# Patient Record
Sex: MALE | Race: WHITE | URBAN - METROPOLITAN AREA
[De-identification: names, ages, dates, MRNs, and addresses within clinical notes are randomized per-mention and may not be internally consistent; named-entity substitution may affect disease eponyms.]

---

## 2018-02-16 ENCOUNTER — APPOINTMENT (OUTPATIENT)
Dept: GENERAL RADIOLOGY | Facility: CLINIC | Age: 68
End: 2018-02-16
Attending: EMERGENCY MEDICINE
Payer: COMMERCIAL

## 2018-02-16 ENCOUNTER — HOSPITAL ENCOUNTER (INPATIENT)
Facility: CLINIC | Age: 68
LOS: 5 days | Discharge: HOME | End: 2018-02-21
Attending: EMERGENCY MEDICINE | Admitting: HOSPITALIST
Payer: COMMERCIAL

## 2018-02-16 DIAGNOSIS — I48.91 ATRIAL FIBRILLATION WITH RAPID VENTRICULAR RESPONSE (H): ICD-10-CM

## 2018-02-16 DIAGNOSIS — J18.9 PNEUMONIA OF BOTH LUNGS DUE TO INFECTIOUS ORGANISM, UNSPECIFIED PART OF LUNG: ICD-10-CM

## 2018-02-16 DIAGNOSIS — J10.1 INFLUENZA A: ICD-10-CM

## 2018-02-16 DIAGNOSIS — J96.01 ACUTE RESPIRATORY FAILURE WITH HYPOXIA (H): Primary | ICD-10-CM

## 2018-02-16 LAB
ANION GAP SERPL CALCULATED.3IONS-SCNC: 9 MMOL/L (ref 3–14)
BASOPHILS # BLD AUTO: 0 10E9/L (ref 0–0.2)
BASOPHILS NFR BLD AUTO: 0.4 %
BUN SERPL-MCNC: 24 MG/DL (ref 7–30)
CALCIUM SERPL-MCNC: 8.2 MG/DL (ref 8.5–10.1)
CHLORIDE SERPL-SCNC: 97 MMOL/L (ref 94–109)
CO2 SERPL-SCNC: 25 MMOL/L (ref 20–32)
CREAT SERPL-MCNC: 0.98 MG/DL (ref 0.66–1.25)
CRP SERPL-MCNC: 430 MG/L (ref 0–8)
DIFFERENTIAL METHOD BLD: ABNORMAL
EOSINOPHIL # BLD AUTO: 0 10E9/L (ref 0–0.7)
EOSINOPHIL NFR BLD AUTO: 0 %
ERYTHROCYTE [DISTWIDTH] IN BLOOD BY AUTOMATED COUNT: 13.6 % (ref 10–15)
GFR SERPL CREATININE-BSD FRML MDRD: 76 ML/MIN/1.7M2
GLUCOSE SERPL-MCNC: 122 MG/DL (ref 70–99)
HCT VFR BLD AUTO: 36.1 % (ref 40–53)
HGB BLD-MCNC: 12.6 G/DL (ref 13.3–17.7)
IMM GRANULOCYTES # BLD: 0.1 10E9/L (ref 0–0.4)
IMM GRANULOCYTES NFR BLD: 1.2 %
INTERPRETATION ECG - MUSE: NORMAL
LACTATE BLD-SCNC: 1.3 MMOL/L (ref 0.7–2)
LYMPHOCYTES # BLD AUTO: 1 10E9/L (ref 0.8–5.3)
LYMPHOCYTES NFR BLD AUTO: 13.4 %
MAGNESIUM SERPL-MCNC: 2.6 MG/DL (ref 1.6–2.3)
MCH RBC QN AUTO: 32.6 PG (ref 26.5–33)
MCHC RBC AUTO-ENTMCNC: 34.9 G/DL (ref 31.5–36.5)
MCV RBC AUTO: 94 FL (ref 78–100)
MONOCYTES # BLD AUTO: 0.7 10E9/L (ref 0–1.3)
MONOCYTES NFR BLD AUTO: 9.1 %
NEUTROPHILS # BLD AUTO: 5.5 10E9/L (ref 1.6–8.3)
NEUTROPHILS NFR BLD AUTO: 75.9 %
NRBC # BLD AUTO: 0 10*3/UL
NRBC BLD AUTO-RTO: 0 /100
NT-PROBNP SERPL-MCNC: 6210 PG/ML (ref 0–900)
PLATELET # BLD AUTO: 196 10E9/L (ref 150–450)
POTASSIUM SERPL-SCNC: 4.4 MMOL/L (ref 3.4–5.3)
PROCALCITONIN SERPL-MCNC: 4.01 NG/ML
RBC # BLD AUTO: 3.86 10E12/L (ref 4.4–5.9)
SODIUM SERPL-SCNC: 131 MMOL/L (ref 133–144)
T4 FREE SERPL-MCNC: 1.5 NG/DL (ref 0.76–1.46)
TROPONIN I SERPL-MCNC: <0.015 UG/L (ref 0–0.04)
TSH SERPL DL<=0.005 MIU/L-ACNC: 0.13 MU/L (ref 0.4–4)
WBC # BLD AUTO: 7.3 10E9/L (ref 4–11)

## 2018-02-16 PROCEDURE — 25000125 ZZHC RX 250: Performed by: HOSPITALIST

## 2018-02-16 PROCEDURE — 83735 ASSAY OF MAGNESIUM: CPT | Performed by: EMERGENCY MEDICINE

## 2018-02-16 PROCEDURE — 25000128 H RX IP 250 OP 636: Performed by: HOSPITALIST

## 2018-02-16 PROCEDURE — 94640 AIRWAY INHALATION TREATMENT: CPT | Mod: 76

## 2018-02-16 PROCEDURE — 83605 ASSAY OF LACTIC ACID: CPT | Performed by: EMERGENCY MEDICINE

## 2018-02-16 PROCEDURE — 25000132 ZZH RX MED GY IP 250 OP 250 PS 637: Performed by: EMERGENCY MEDICINE

## 2018-02-16 PROCEDURE — 80048 BASIC METABOLIC PNL TOTAL CA: CPT | Performed by: EMERGENCY MEDICINE

## 2018-02-16 PROCEDURE — 87181 SC STD AGAR DILUTION PER AGT: CPT | Performed by: EMERGENCY MEDICINE

## 2018-02-16 PROCEDURE — 25000132 ZZH RX MED GY IP 250 OP 250 PS 637: Performed by: HOSPITALIST

## 2018-02-16 PROCEDURE — 87040 BLOOD CULTURE FOR BACTERIA: CPT | Performed by: EMERGENCY MEDICINE

## 2018-02-16 PROCEDURE — 87205 SMEAR GRAM STAIN: CPT | Performed by: HOSPITALIST

## 2018-02-16 PROCEDURE — 84484 ASSAY OF TROPONIN QUANT: CPT | Performed by: EMERGENCY MEDICINE

## 2018-02-16 PROCEDURE — 27210995 ZZH RX 272: Performed by: EMERGENCY MEDICINE

## 2018-02-16 PROCEDURE — 84439 ASSAY OF FREE THYROXINE: CPT | Performed by: EMERGENCY MEDICINE

## 2018-02-16 PROCEDURE — 87800 DETECT AGNT MULT DNA DIREC: CPT | Performed by: EMERGENCY MEDICINE

## 2018-02-16 PROCEDURE — 85025 COMPLETE CBC W/AUTO DIFF WBC: CPT | Performed by: EMERGENCY MEDICINE

## 2018-02-16 PROCEDURE — 84145 PROCALCITONIN (PCT): CPT | Performed by: EMERGENCY MEDICINE

## 2018-02-16 PROCEDURE — 71046 X-RAY EXAM CHEST 2 VIEWS: CPT

## 2018-02-16 PROCEDURE — 84443 ASSAY THYROID STIM HORMONE: CPT | Performed by: EMERGENCY MEDICINE

## 2018-02-16 PROCEDURE — 96375 TX/PRO/DX INJ NEW DRUG ADDON: CPT

## 2018-02-16 PROCEDURE — 25000128 H RX IP 250 OP 636: Performed by: EMERGENCY MEDICINE

## 2018-02-16 PROCEDURE — 21000000 ZZH R&B IMCU HEART CARE

## 2018-02-16 PROCEDURE — 87077 CULTURE AEROBIC IDENTIFY: CPT | Performed by: EMERGENCY MEDICINE

## 2018-02-16 PROCEDURE — 99285 EMERGENCY DEPT VISIT HI MDM: CPT | Mod: 25

## 2018-02-16 PROCEDURE — 96365 THER/PROPH/DIAG IV INF INIT: CPT

## 2018-02-16 PROCEDURE — 40000275 ZZH STATISTIC RCP TIME EA 10 MIN

## 2018-02-16 PROCEDURE — 87070 CULTURE OTHR SPECIMN AEROBIC: CPT | Performed by: HOSPITALIST

## 2018-02-16 PROCEDURE — 86140 C-REACTIVE PROTEIN: CPT | Performed by: EMERGENCY MEDICINE

## 2018-02-16 PROCEDURE — 93005 ELECTROCARDIOGRAM TRACING: CPT

## 2018-02-16 PROCEDURE — 99223 1ST HOSP IP/OBS HIGH 75: CPT | Mod: AI | Performed by: HOSPITALIST

## 2018-02-16 PROCEDURE — 83880 ASSAY OF NATRIURETIC PEPTIDE: CPT | Performed by: EMERGENCY MEDICINE

## 2018-02-16 RX ORDER — POTASSIUM CL/LIDO/0.9 % NACL 10MEQ/0.1L
10 INTRAVENOUS SOLUTION, PIGGYBACK (ML) INTRAVENOUS
Status: DISCONTINUED | OUTPATIENT
Start: 2018-02-16 | End: 2018-02-21 | Stop reason: HOSPADM

## 2018-02-16 RX ORDER — ACETAMINOPHEN 325 MG/1
975 TABLET ORAL ONCE
Status: COMPLETED | OUTPATIENT
Start: 2018-02-16 | End: 2018-02-16

## 2018-02-16 RX ORDER — NITROGLYCERIN 0.4 MG/1
0.4 TABLET SUBLINGUAL EVERY 5 MIN PRN
Status: DISCONTINUED | OUTPATIENT
Start: 2018-02-16 | End: 2018-02-21

## 2018-02-16 RX ORDER — MAGNESIUM SULFATE HEPTAHYDRATE 40 MG/ML
4 INJECTION, SOLUTION INTRAVENOUS EVERY 4 HOURS PRN
Status: DISCONTINUED | OUTPATIENT
Start: 2018-02-16 | End: 2018-02-21 | Stop reason: HOSPADM

## 2018-02-16 RX ORDER — POTASSIUM CHLORIDE 1.5 G/1.58G
20-40 POWDER, FOR SOLUTION ORAL
Status: DISCONTINUED | OUTPATIENT
Start: 2018-02-16 | End: 2018-02-21 | Stop reason: HOSPADM

## 2018-02-16 RX ORDER — SODIUM CHLORIDE 9 MG/ML
INJECTION, SOLUTION INTRAVENOUS CONTINUOUS
Status: DISCONTINUED | OUTPATIENT
Start: 2018-02-16 | End: 2018-02-17

## 2018-02-16 RX ORDER — ONDANSETRON 4 MG/1
4 TABLET, ORALLY DISINTEGRATING ORAL EVERY 6 HOURS PRN
Status: DISCONTINUED | OUTPATIENT
Start: 2018-02-16 | End: 2018-02-21 | Stop reason: HOSPADM

## 2018-02-16 RX ORDER — POTASSIUM CHLORIDE 29.8 MG/ML
20 INJECTION INTRAVENOUS
Status: DISCONTINUED | OUTPATIENT
Start: 2018-02-16 | End: 2018-02-21 | Stop reason: HOSPADM

## 2018-02-16 RX ORDER — LIDOCAINE 40 MG/G
CREAM TOPICAL
Status: CANCELLED | OUTPATIENT
Start: 2018-02-16

## 2018-02-16 RX ORDER — NALOXONE HYDROCHLORIDE 0.4 MG/ML
.1-.4 INJECTION, SOLUTION INTRAMUSCULAR; INTRAVENOUS; SUBCUTANEOUS
Status: DISCONTINUED | OUTPATIENT
Start: 2018-02-16 | End: 2018-02-21 | Stop reason: HOSPADM

## 2018-02-16 RX ORDER — ASPIRIN 81 MG/1
81 TABLET, CHEWABLE ORAL DAILY
Status: DISCONTINUED | OUTPATIENT
Start: 2018-02-16 | End: 2018-02-21 | Stop reason: HOSPADM

## 2018-02-16 RX ORDER — ACETAMINOPHEN 325 MG/1
650 TABLET ORAL EVERY 4 HOURS PRN
Status: DISCONTINUED | OUTPATIENT
Start: 2018-02-16 | End: 2018-02-21 | Stop reason: HOSPADM

## 2018-02-16 RX ORDER — EAR PLUGS
1 EACH OTIC (EAR) DAILY
COMMUNITY
End: 2020-07-02

## 2018-02-16 RX ORDER — MORPHINE SULFATE 2 MG/ML
1 INJECTION, SOLUTION INTRAMUSCULAR; INTRAVENOUS
Status: DISCONTINUED | OUTPATIENT
Start: 2018-02-16 | End: 2018-02-21 | Stop reason: HOSPADM

## 2018-02-16 RX ORDER — POLYETHYLENE GLYCOL 3350 17 G/17G
17 POWDER, FOR SOLUTION ORAL DAILY PRN
Status: DISCONTINUED | OUTPATIENT
Start: 2018-02-16 | End: 2018-02-21 | Stop reason: HOSPADM

## 2018-02-16 RX ORDER — AMOXICILLIN 250 MG
2 CAPSULE ORAL 2 TIMES DAILY PRN
Status: DISCONTINUED | OUTPATIENT
Start: 2018-02-16 | End: 2018-02-21 | Stop reason: HOSPADM

## 2018-02-16 RX ORDER — METOPROLOL TARTRATE 1 MG/ML
5 INJECTION, SOLUTION INTRAVENOUS ONCE
Status: DISCONTINUED | OUTPATIENT
Start: 2018-02-16 | End: 2018-02-16

## 2018-02-16 RX ORDER — ACETAMINOPHEN 650 MG/1
650 SUPPOSITORY RECTAL EVERY 4 HOURS PRN
Status: DISCONTINUED | OUTPATIENT
Start: 2018-02-16 | End: 2018-02-21 | Stop reason: HOSPADM

## 2018-02-16 RX ORDER — ONDANSETRON 2 MG/ML
4 INJECTION INTRAMUSCULAR; INTRAVENOUS EVERY 6 HOURS PRN
Status: DISCONTINUED | OUTPATIENT
Start: 2018-02-16 | End: 2018-02-21 | Stop reason: HOSPADM

## 2018-02-16 RX ORDER — GUAIFENESIN/DEXTROMETHORPHAN 100-10MG/5
10 SYRUP ORAL EVERY 4 HOURS PRN
Status: DISCONTINUED | OUTPATIENT
Start: 2018-02-16 | End: 2018-02-21 | Stop reason: HOSPADM

## 2018-02-16 RX ORDER — POTASSIUM CHLORIDE 1500 MG/1
20-40 TABLET, EXTENDED RELEASE ORAL
Status: DISCONTINUED | OUTPATIENT
Start: 2018-02-16 | End: 2018-02-21 | Stop reason: HOSPADM

## 2018-02-16 RX ORDER — HYDROCODONE BITARTRATE AND ACETAMINOPHEN 5; 325 MG/1; MG/1
1-2 TABLET ORAL EVERY 4 HOURS PRN
Status: DISCONTINUED | OUTPATIENT
Start: 2018-02-16 | End: 2018-02-21 | Stop reason: HOSPADM

## 2018-02-16 RX ORDER — DIPHENHYDRAMINE HYDROCHLORIDE AND LIDOCAINE HYDROCHLORIDE AND ALUMINUM HYDROXIDE AND MAGNESIUM HYDRO
10 KIT EVERY 6 HOURS PRN
Status: DISCONTINUED | OUTPATIENT
Start: 2018-02-16 | End: 2018-02-21 | Stop reason: HOSPADM

## 2018-02-16 RX ORDER — BISACODYL 10 MG
10 SUPPOSITORY, RECTAL RECTAL DAILY PRN
Status: DISCONTINUED | OUTPATIENT
Start: 2018-02-16 | End: 2018-02-21 | Stop reason: HOSPADM

## 2018-02-16 RX ORDER — METOPROLOL TARTRATE 25 MG/1
25 TABLET, FILM COATED ORAL ONCE
Status: COMPLETED | OUTPATIENT
Start: 2018-02-16 | End: 2018-02-16

## 2018-02-16 RX ORDER — METOPROLOL TARTRATE 1 MG/ML
2.5 INJECTION, SOLUTION INTRAVENOUS
Status: DISCONTINUED | OUTPATIENT
Start: 2018-02-16 | End: 2018-02-21 | Stop reason: HOSPADM

## 2018-02-16 RX ORDER — AMOXICILLIN 250 MG
1 CAPSULE ORAL 2 TIMES DAILY PRN
Status: DISCONTINUED | OUTPATIENT
Start: 2018-02-16 | End: 2018-02-21 | Stop reason: HOSPADM

## 2018-02-16 RX ORDER — OSELTAMIVIR PHOSPHATE 75 MG/1
75 CAPSULE ORAL 2 TIMES DAILY
Status: DISCONTINUED | OUTPATIENT
Start: 2018-02-16 | End: 2018-02-19

## 2018-02-16 RX ORDER — METOPROLOL TARTRATE 25 MG/1
25 TABLET, FILM COATED ORAL EVERY 8 HOURS
Status: DISCONTINUED | OUTPATIENT
Start: 2018-02-17 | End: 2018-02-18

## 2018-02-16 RX ORDER — IPRATROPIUM BROMIDE AND ALBUTEROL SULFATE 2.5; .5 MG/3ML; MG/3ML
3 SOLUTION RESPIRATORY (INHALATION) 4 TIMES DAILY
Status: DISCONTINUED | OUTPATIENT
Start: 2018-02-16 | End: 2018-02-21 | Stop reason: HOSPADM

## 2018-02-16 RX ORDER — POTASSIUM CHLORIDE 7.45 MG/ML
10 INJECTION INTRAVENOUS
Status: DISCONTINUED | OUTPATIENT
Start: 2018-02-16 | End: 2018-02-21 | Stop reason: HOSPADM

## 2018-02-16 RX ADMIN — CEFTRIAXONE 2 G: 10 INJECTION, POWDER, FOR SOLUTION INTRAVENOUS at 17:57

## 2018-02-16 RX ADMIN — SODIUM CHLORIDE: 9 INJECTION, SOLUTION INTRAVENOUS at 19:50

## 2018-02-16 RX ADMIN — IPRATROPIUM BROMIDE AND ALBUTEROL SULFATE 3 ML: .5; 3 SOLUTION RESPIRATORY (INHALATION) at 19:55

## 2018-02-16 RX ADMIN — SODIUM CHLORIDE 1000 ML: 9 INJECTION, SOLUTION INTRAVENOUS at 16:07

## 2018-02-16 RX ADMIN — METOPROLOL TARTRATE 25 MG: 25 TABLET ORAL at 18:32

## 2018-02-16 RX ADMIN — ENOXAPARIN SODIUM 40 MG: 40 INJECTION SUBCUTANEOUS at 20:55

## 2018-02-16 RX ADMIN — ASPIRIN 81 MG 81 MG: 81 TABLET ORAL at 20:55

## 2018-02-16 RX ADMIN — AZITHROMYCIN MONOHYDRATE 500 MG: 500 INJECTION, POWDER, LYOPHILIZED, FOR SOLUTION INTRAVENOUS at 17:58

## 2018-02-16 RX ADMIN — OSELTAMIVIR PHOSPHATE 75 MG: 75 CAPSULE ORAL at 20:55

## 2018-02-16 RX ADMIN — ACETAMINOPHEN 975 MG: 325 TABLET, FILM COATED ORAL at 17:58

## 2018-02-16 ASSESSMENT — ACTIVITIES OF DAILY LIVING (ADL)
BATHING: 0-->INDEPENDENT
DRESS: 0-->INDEPENDENT
TOILETING: 0-->INDEPENDENT
RETIRED_COMMUNICATION: 0-->UNDERSTANDS/COMMUNICATES WITHOUT DIFFICULTY
SWALLOWING: 0-->SWALLOWS FOODS/LIQUIDS WITHOUT DIFFICULTY
TRANSFERRING: 0-->INDEPENDENT
AMBULATION: 0-->INDEPENDENT
FALL_HISTORY_WITHIN_LAST_SIX_MONTHS: NO
RETIRED_EATING: 0-->INDEPENDENT
COGNITION: 0 - NO COGNITION ISSUES REPORTED

## 2018-02-16 ASSESSMENT — ENCOUNTER SYMPTOMS
FEVER: 1
SHORTNESS OF BREATH: 1
DIFFICULTY URINATING: 0
BLOOD IN STOOL: 0
COUGH: 1
SORE THROAT: 1
CONSTIPATION: 1
PALPITATIONS: 0
RHINORRHEA: 1
MYALGIAS: 0
DIARRHEA: 0

## 2018-02-16 NOTE — ED PROVIDER NOTES
History     Chief Complaint:  Shortness of breath     HPI   Rafael Josue is a 67 year old male who presents to the emergency department for evaluation of shortness of breath. The patient is a poor historian. The patient states that his symptoms began six days ago. He did not feel like his normal self at that time, and he adds that he felt feverish with subjective fever. [facility], and a flu swab was done six days ago. The patient has Influenza A, and he has been taking Tamiflu for five days. He was sent here to the clinic for evaluation today because he was in new Atrial Fibrillation while there that he has never had before. His clinician states that she would have sent him anyhow to rule out pneumonia. Now, he adds that he still has productive cough, sore throat, rhinorrhea, some intermittent constipation, myalgias, fever, and shortness of breath getting worse since his diagnosis. He denies diarrhea and blood in his stool, decreased urine, difficulty urinating, and palpitations. He denies a known history of COPD, diabetes, liver disease, kidney disease, a history of arrhythmias, or any notable health issues.     Cardiac/PE/DVT Risk Factors:  History of hypertension - negative   History of hyperlipidemia - positive   History of diabetes - negative   History of smoking - positive   Personal history of PE/DVT - negative   Family history of PE/DVT - negative   Family history of heart complications - negative   Recent travel - positive   Recent surgery - negative   Other immobilizations - positive Influenza A recently  Cancer - negative     Allergies:  No Known Drug Allergies     Medications:    The patient is currently on no regular medications.     Past Medical History:    Former smoker  Overweight  Adenomatous Polyp of descending colon    Carpal tunnel syndrome  Polycythemia - mild   Hyperlipidemia  Crushing injury to left elbow  Inguinal hernia    Past Surgical History:    ORIF  "left elbow     Family History:    History reviewed. No pertinent family history.      Social History:  The patient was accompanied to the ED by his wife.  Smoking Status: Former, 0.5-1 PPD   Smokeless Tobacco: Former  Alcohol Use: No   Marital Status:        Review of Systems   Constitutional: Positive for fever.   HENT: Positive for rhinorrhea and sore throat.    Respiratory: Positive for cough and shortness of breath.    Cardiovascular: Negative for palpitations and leg swelling.   Gastrointestinal: Positive for constipation. Negative for blood in stool and diarrhea.   Genitourinary: Negative for decreased urine volume and difficulty urinating.   Musculoskeletal: Negative for myalgias.   All other systems reviewed and are negative.      Physical Exam     Patient Vitals for the past 24 hrs:   BP Temp Temp src Heart Rate Resp SpO2 Height Weight   02/16/18 1536 140/85 - - 120 (!) 39 93 % - -   02/16/18 1525 123/74 99.9  F (37.7  C) Oral 127 - (!) 85 % 1.753 m (5' 9\") 81.6 kg (180 lb)        Physical Exam  SKIN:  Warm, dry.  No jaundice.  No rash.  HEMATOLOGIC/IMMUNOLOGIC/LYMPHATIC:  No pallor.  No edema.  HENT:  Moist oral mucosa.  Normal phonation.  No stridor.  No JVD.  EYES:  Conjunctivae normal.  Anicteric.  CARDIOVASCULAR:  Tachycardic rate and irregularly irregular rhythm.  No murmur.  RESPIRATORY:  No respiratory distress, breath sounds equal and coarse throughout.  GASTROINTESTINAL:  Soft, nontender abdomen.  NEUROLOGIC:  Alert, conversant.  PSYCHIATRIC:  Normal mood.    Emergency Department Course     ECG:  Indication: Shortness of breath   Completed at 1609.  Read at 1658.   Atrial Fibrillation with RVR  Abnormal ECG  Compared to old ECG, no significant changes   Rate 117 bpm. VT interval *. QRS duration 92. QT/QTc 312/435. P-R-T axes * -3 43.    Imaging:  Radiology findings were communicated with the patient and family who voiced understanding of the findings.    XR Chest 2 views:  IMPRESSION: " Bilateral pulmonary opacities primarily left upper lung  and right base.  Report per radiology     Laboratory:  Laboratory findings were communicated with the patient and family who voiced understanding of the findings.    CBC: HGB 12.6 (L) o/w WNL. (WBC 7.3, )   BMP:  (L), Glucose 122 (H), Calcium 8.2 (L) o/w WNL (Creatinine 0.98)    Troponin (Collected 1605): <0.015  Lactic Acid: 1.3    Blood cultures: Pending  Blood cultures: Pending    Interventions:  1607 - NS Bolus 1,000mL IV   1757 - Rocephin 2g IV  1758 - Acetaminophen 975mg PO  1758 - Azithromycin 500mg IV    Emergency Department Course:  Nursing notes and vitals reviewed.  EKG obtained in the ED, see results above.   The patient was sent for a XR Chest 2 views while in the emergency department, results above.   IV was inserted and blood was drawn for laboratory testing, results above.    1549: I performed an exam of the patient as documented above.   1726: Patient rechecked and updated.   1734: I spoke with Dr. Spicer of the Hospitalist service regarding patient's presentation, findings, and plan of care.    Findings and plan explained to the Patient and spouse who consents to admission. Discussed the patient with Dr. Spicer, who will admit the patient to a [facility] bed for further monitoring, evaluation, and treatment.   I personally reviewed the laboratory and imaging results with the Patient and spouse and answered all related questions prior to admission.    Impression & Plan      Medical Decision Making:  Rafael Josue is a 67 year old male who presents to the emergency department today with symptoms of a respiratory infectious disease and was recently diagnosed and treated for influenza A with tamiflu. He just finished that. He's been feeling a bit worse and imaging revealed bilateral pneumonia. Incidently, noted to be in Atrial Fibrillation with RVR which would be a new arrhythmia for him. He was hypoxic before being administered  nasal cannula oxygenation. His pulse rate did drop somewhat with IV fluid bolus and after discussion with the admitting physician Dr. Spicer we opted for a dose of oral metoprolol for further rate control, IV antibiotics regarding community acquired pneumonia and admission for close monitoring, treatment and testing as needed.     Critical Care time:  none    Diagnosis:    ICD-10-CM    1. Atrial fibrillation with rapid ventricular response (H) I48.91    2. Pneumonia of both lungs due to infectious organism, unspecified part of lung J18.9    3. Influenza A J10.1        Disposition:  Admitted to [facility]    Scribe Disclosure:  Brigitte PETERSEN, am serving as a scribe at 3:49 PM on 2/16/2018 to document services personally performed by Jeremy De Paz MD based on my observations and the provider's statements to me.   2/16/2018    EMERGENCY DEPARTMENT       Jeremy De Paz MD  02/17/18 1004

## 2018-02-16 NOTE — IP AVS SNAPSHOT
MRN:[id number]                      After Visit Summary   2/16/2018    Rafael Josue    MRN: [id number]           Thank you!     Thank you for choosing [facility] for your care. Our goal is always to provide you with excellent care. Hearing back from our patients is one way we can continue to improve our services. Please take a few minutes to complete the written survey that you may receive in the mail after you visit with us. Thank you!        Patient Information     Date Of Birth          1950        Designated Caregiver       Most Recent Value    Caregiver    Will someone help with your care after discharge? yes    Name of designated caregiver Meet Spouse    Phone number of caregiver See pt information/contact    Caregiver address see pt information/contact      About your hospital stay     You were admitted on:  February 16, 2018 [facility]    You were discharged on:  February 21, 2018        Reason for your hospital stay       You were admitted with shortness of breath from influenza/pneumonias and had irregular heartbeat-atrial fibrillation.    Diagnosis  Acute hypoxic respiratory failure requiring supplemental oxygen from pneumonia.  Community acquired pneumonia at-risk for bacterial organisms.   New onset atrial fibrillation with rapid ventricular response-likely in the setting of pneumonia/respiratory decompensation.    Influenza Upper respiratory infection   Gram-negative bacteremia with Haemophilus influenza - cleared    Mild hyponatremia from poor oral intake-improved  Mild rhabdomyolysis likely from acute illness/ flu   Supressed TSH likely from acute illness  Nicotine dependence  Incidental pulmonary nodule-left lung 0.8 cm  Concern for obstructive sleep apnea.  Prediabetes with hemoglobin A1c of 6.3.  Concern for mild cognitive impairment   Deconditioned multifactorial                  Who to Call     For medical  emergencies, please call 911.  For non-urgent questions about your medical care, please call your primary care provider or clinic, [phone]          Attending Provider     Provider Specialty    Jeremy De Paz MD Emergency Medicine    Marvin Spicer MD Internal Medicine       Primary Care Provider Office Phone # Fax #    [facility] [phone]       When to contact your care team       Call your primary doctor if you have any of the following:  increased shortness of breath any chest pain or palpitations.                  After Care Instructions     Activity       Your activity upon discharge: activity as tolerated and no driving until cognitive assessment as outpatient            Diet       Follow this diet upon discharge: Orders Placed This Encounter      Combination Diet Low Saturated Fat Na <2400mg Diet, No Caffeine Diet            Discharge Instructions       Follow-up for incidental pulmonary nodule per protocol.  Recommend follow-up imaging of chest after resolution of symptoms.  Aggressive incentives spirometry.  Supportive care with Robitussin syrup/lozenges.  On completer two weeks course of antibiotics.  Supplemental nasal oxygen to keep saturations about 90%. Wean as tolerated.  Low-salt low carb diet.  Discuss long-term anticoagulation on cardiology visit.  Event recorder at the time of discharge with cardiology follow-up.  Consider overnight pulse oximetry/sleep study once symptoms improve.   Consider lung function tests given patient's history of smoking.  closely monitor INR while on antibiotics.Goal INR between 2 to 3.  Emphasized on frequent handwashing and adequate oral hydration.  Recheck thyroid function in 4 to 6 weeks.  Strongly consider smoking cessation.  Monitor blood sugars /hemoglobin A1c periodically   outpatient cognitive assessment.  No driving until clearance from medical provider.   Note off for work for work until PCP visit  provided.            Monitor and record       blood pressure/heart rate daily review on PCP visit. Optimize dose of metoprolol/Cardizem/Lasix on provider visit.            Oxygen Adult       [facility] Order 2 liter(s) by nasal cannula continuously with use of portable tank. Expected treatment length is 2 weeks.. Test on conserving device as applicable.    Patients who qualify for home O2 coverage under the CMS guidelines require ABG tests or O2 sat readings obtained closest to, but no earlier than 2 days prior to the discharge, as evidence of the need for home oxygen therapy. Testing must be performed while patient is in the chronic stable state. See notes for O2 sats.    I certify that this patient, Rafael Josue has been under my care and that I, or a nurse practitioner or physician's assistant working with me, had a face-to-face encounter that meets the face-to-face encounter requirements with this patient on 2/16/2018. Patient will have reassessment on PCP visit in few days for weaning off nasal oxygen. The patient, Rafael Josue was evaluated or treated in whole, or in part, for the following medical condition, which necessitates the use of the ordered oxygen    . Treatment Diagnosis: Acute hypoxic respiratory failure requiring supplemental oxygen from pneumonia.  Community acquired pneumonia at-risk for bacterial organisms.   New onset atrial fibrillation with rapid ventricular response-likely in the setting of pneumonia/respiratory decompensation.    Influenza Upper respiratory infection   nicotine dependence    Attending Provider: Marvin Spicer  Physician signature: See electronic signature associated with these discharge orders  Date of Order: February 21, 2018                  Follow-up Appointments     Follow-up and recommended labs and tests        Follow up appointment is at 2/23 at 1:30pm with Dr. Hand  At [facility]  [address] [place] [address] [address]  [place]  [address]  [phone]    Please bring discharge paperwork from hospital.  You will have your INR drawn then            Follow-up and recommended labs and tests        Follow up with primary care provider, [facility], within 3 days for hospital follow- up.  The following labs/tests are recommended: INR / BMP.                  Your next 10 appointments already scheduled     Mar 26, 2018  1:50 PM CDT   Return Discharge with JACKELIN Vasquez CNP   [facility] ([facility])    [facility] [facility] [address]  [facility] [id number] [phone]              Additional Services     Follow-Up with Cardiac Advanced Practice Provider                 Future tests that were ordered for you     Cardiac Event Monitor - Peds/Adult                 Further instructions from your care team       Oxygen Provider:  Arranged through [employer], contact number [phone].If you have any questions or concerns please call the oxygen company directly.      Warfarin Instruction     You have started taking a medicine called warfarin. This is a blood-thinning medicine (anticoagulant). It helps prevent and treat blood clots.      Before leaving the hospital, make sure you know how much to take and how long to take it.      You will need regular blood tests to make sure your blood is clotting safely. It is very important to see your doctor for regular blood tests.    Talk to your doctor before taking any new medicine (this includes over-the-counter drugs and herbal products). Many medicines can interact with warfarin. This may cause more bleeding or too much clotting.     Eating a lot of vitamin K--found in green, leafy vegetables--can change the way warfarin works in your body. Do NOT avoid these foods. Instead, try to eat the same amount each day.     Bleeding is the most common side-effect of warfarin. You may notice bleeding gums, a bloody  "nose, bruises and bleeding longer when you cut yourself. See a doctor at once if:   o You cough up blood  o You find blood in your stool (poop)  o You have a deep cut, or a cut that bleeds longer than 10 minutes   o You have a bad cut, hard fall, accident or hit your head (go to urgent care or the emergency room).    For women who can get pregnant: This medicine can harm an unborn baby. Be very careful not to get pregnant while taking this medicine. If you think you might be pregnant, call your doctor right away.    For more information, read \"Guide to Warfarin Therapy,  the booklet you received in the hospital.        Pending Results     Date and Time Order Name Status Description    2/18/2018 0601 Blood culture Preliminary     2/18/2018 0601 Blood culture Preliminary     2/16/2018 1600 Blood culture Preliminary     2/16/2018 1600 Blood culture Preliminary             Statement of Approval     Ordered          02/21/18 1143  I have reviewed and agree with all the recommendations and orders detailed in this document.  EFFECTIVE NOW     Approved and electronically signed by:  Marvin Spicer MD             Admission Information     Date & Time Provider Department Dept. Phone    2/16/2018 Marvin Spicer MD [facility] [phone]      Your Vitals Were     Blood Pressure Temperature Respirations Height Weight Pulse Oximetry    112/76 98.3  F (36.8  C) (Axillary) 22 1.753 m (5' 9\") 82.7 kg (182 lb 6.4 oz) 93%    BMI (Body Mass Index)                   26.94 kg/m2           [facility] Information     [facility] lets you send messages to your doctor, view your test results, renew your prescriptions, schedule appointments and more. To sign up, go to [email or web address] . Click on \"Log in\" on the left side of the screen, which will take you to the Welcome page. Then click on \"Sign up Now\" on the right side of the page.     You will be asked to enter the access code listed below, as well as " some personal information. Please follow the directions to create your username and password.     Your access code is: [email or web address]  Expires: 2018 11:36 AM     Your access code will  in 90 days. If you need help or a new code, please call your [place] clinic or [phone].        Care EveryWhere ID     This is your Care EveryWhere ID. This could be used by other organizations to access your [place] medical records  [id number]        Equal Access to Services     ALEXEI BORDEN : Hadii aad ku hadasho Soomaali, waaxda luqadaha, qaybta kaalmada adeegyada, estefany michaudin haymilli de leon . So [facility] [phone].    ATENCIÓN: Si habla español, tiene a luna disposición servicios gratuitos de asistencia lingüística. Llame al [phone].    We comply with applicable federal civil rights laws and [place] laws. We do not discriminate on the basis of race, color, national origin, age, disability, sex, sexual orientation, or gender identity.               Review of your medicines      START taking        Dose / Directions    diltiazem 120 MG 24 hr capsule   Commonly known as:  CARDIZEM CD   Used for:  Atrial fibrillation with rapid ventricular response (H)        Dose:  120 mg   Take 1 capsule (120 mg) by mouth daily Hold if systolic BP < 100 or HR < 50   Quantity:  15 capsule   Refills:  0       furosemide 20 MG tablet   Commonly known as:  LASIX   Used for:  Acute respiratory failure with hypoxia (H)        Dose:  20 mg   Take 1 tablet (20 mg) by mouth daily Reassess on PCP visit. Hold if systolic BP < 100   Quantity:  7 tablet   Refills:  0       levofloxacin 500 MG tablet   Commonly known as:  LEVAQUIN   Used for:  Pneumonia of both lungs due to infectious organism, unspecified part of lung        Dose:  500 mg   Take 1 tablet (500 mg) by mouth daily   Quantity:  7 tablet   Refills:  0       metoprolol tartrate 50 MG tablet   Commonly known as:  LOPRESSOR   Used for:  Atrial fibrillation with rapid  ventricular response (H)        Dose:  50 mg   Take 1 tablet (50 mg) by mouth 2 times daily   Quantity:  30 tablet   Refills:  0       warfarin 5 MG tablet   Commonly known as:  COUMADIN   Used for:  Atrial fibrillation with rapid ventricular response (H)        Dose:  7.5 mg   Take 1.5 tablets (7.5 mg) by mouth daily   Quantity:  5 tablet   Refills:  0         CONTINUE these medicines which have NOT CHANGED        Dose / Directions    MULTI FOR HIM Pack        Dose:  1 packet   Take 1 packet by mouth daily Patient takes a pack of vitamins a day. Includes Vitamin A,E,D,C & B Vitamins. Patient does not know exact amounts of vitamins.)   Refills:  0            Where to get your medicines      These medications were sent to [facility] - [address]  [address] [facility] [address], [place] [address]     Phone:  [phone]     diltiazem 120 MG 24 hr capsule    furosemide 20 MG tablet    levofloxacin 500 MG tablet    metoprolol tartrate 50 MG tablet    warfarin 5 MG tablet                Protect others around you: Learn how to safely use, store and throw away your medicines at www.disposemymeds.org.        ANTIBIOTIC INSTRUCTION     You've Been Prescribed an Antibiotic - Now What?  Your healthcare team thinks that you or your loved one might have an infection. Some infections can be treated with antibiotics, which are powerful, life-saving drugs. Like all medications, antibiotics have side effects and should only be used when necessary. There are some important things you should know about your antibiotic treatment.      Your healthcare team may run tests before you start taking an antibiotic.    Your team may take samples (e.g., from your blood, urine or other areas) to run tests to look for bacteria. These test can be important to determine if you need an antibiotic at all and, if you do, which antibiotic will work best.      Within a few days, your healthcare team might change or even stop  your antibiotic.    Your team may start you on an antibiotic while they are working to find out what is making you sick.    Your team might change your antibiotic because test results show that a different antibiotic would be better to treat your infection.    In some cases, once your team has more information, they learn that you do not need an antibiotic at all. They may find out that you don't have an infection, or that the antibiotic you're taking won't work against your infection. For example, an infection caused by a virus can't be treated with antibiotics. Staying on an antibiotic when you don't need it is more likely to be harmful than helpful.      You may experience side effects from your antibiotic.    Like all medications, antibiotics have side effects. Some of these can be serious.    Let you healthcare team know if you have any known allergies when you are admitted to the hospital.    One significant side effect of nearly all antibiotics is the risk of severe and sometimes deadly diarrhea caused by Clostridium difficile (C. Difficile). This occurs when a person takes antibiotics because some good germs are destroyed. Antibiotic use allows C. diificile to take over, putting patients at high risk for this serious infection.    As a patient or caregiver, it is important to understand your or your loved one's antibiotic treatment. It is especially important for caregivers to speak up when patients can't speak for themselves. Here are some important questions to ask your healthcare team.    What infection is this antibiotic treating and how do you know I have that infection?    What side effects might occur from this antibiotic?    How long will I need to take this antibiotic?    Is it safe to take this antibiotic with other medications or supplements (e.g., vitamins) that I am taking?     Are there any special directions I need to know about taking this antibiotic? For example, should I take it with  food?    How will I be monitored to know whether my infection is responding to the antibiotic?    What tests may help to make sure the right antibiotic is prescribed for me?      Information provided by:  www.cdc.gov/getsmart  U.S. Department of Health and Human Services  Centers for disease Control and Prevention  National Center for Emerging and Zoonotic Infectious Diseases  Division of Healthcare Quality Promotion             Medication List: This is a list of all your medications and when to take them. Check marks below indicate your daily home schedule. Keep this list as a reference.      Medications           Morning Afternoon Evening Bedtime As Needed    diltiazem 120 MG 24 hr capsule   Commonly known as:  CARDIZEM CD   Take 1 capsule (120 mg) by mouth daily Hold if systolic BP < 100 or HR < 50   Next Dose Due:  Tomorrow morning, 2/22/18                                   furosemide 20 MG tablet   Commonly known as:  LASIX   Take 1 tablet (20 mg) by mouth daily Reassess on PCP visit. Hold if systolic BP < 100   Last time this was given:  20 mg on 2/17/2018  8:53 AM   Next Dose Due:  Tomorrow morning, 2/22/18                                   levofloxacin 500 MG tablet   Commonly known as:  LEVAQUIN   Take 1 tablet (500 mg) by mouth daily   Next Dose Due:  This evening around dinner, 2/21/18                                   metoprolol tartrate 50 MG tablet   Commonly known as:  LOPRESSOR   Take 1 tablet (50 mg) by mouth 2 times daily   Last time this was given:  50 mg on 2/21/2018  8:34 AM   Next Dose Due:  Tonight at bedtime, 2/21/18                                      MULTI FOR HIM Pack   Take 1 packet by mouth daily Patient takes a pack of vitamins a day. Includes Vitamin A,E,D,C & B Vitamins. Patient does not know exact amounts of vitamins.)   Next Dose Due:  Tomorrow morning, 2/22/18                                   warfarin 5 MG tablet   Commonly known as:  COUMADIN   Take 1.5 tablets (7.5 mg) by mouth  daily   Last time this was given:  7.5 mg on 2/20/2018  5:46 PM   Next Dose Due:  This evening around dinner, 2/21/18                                             More Information        Pneumonia (Adult)  Pneumonia is an infection deep within the lungs. It is in the small air sacs (alveoli). Pneumonia may be caused by a virus or bacteria. Pneumonia caused by bacteria is usually treated with an antibiotic. Severe cases may need to be treated in the hospital. Milder cases can be treated at home. Symptoms usually start to get better during the first 2 days of treatment.    Home care  Follow these guidelines when caring for yourself at home:    Rest at home for the first 2 to 3 days, or until you feel stronger. Don t let yourself get overly tired when you go back to your activities.    Stay away from cigarette smoke - yours or other people s.    You may use acetaminophen or ibuprofen to control fever or pain, unless another medicine was prescribed. If you have chronic liver or kidney disease, talk with your healthcare provider before using these medicines. Also talk with your provider if you ve had a stomach ulcer or gastrointestinal bleeding. Don t give aspirin to anyone younger than 18 years of age who is ill with a fever. It may cause severe liver damage.    Your appetite may be poor, so a light diet is fine.    Drink 6 to 8 glasses of fluids every day to make sure you are getting enough fluids. Beverages can include water, sport drinks, sodas without caffeine, juices, tea, or soup. Fluids will help loosen secretions in the lung. This will make it easier for you to cough up the phlegm (sputum). If you also have heart or kidney disease, check with your healthcare provider before you drink extra fluids.    Take antibiotic medicine prescribed until it is all gone, even if you are feeling better after a few days.  Follow-up care  Follow up with your healthcare provider in the next 2 to 3 days, or as advised. This is to  be sure the medicine is helping you get better.  If you are 65 or older, you should get a pneumococcal vaccine and a yearly flu (influenza) shot. You should also get these vaccines if you have chronic lung disease like asthma, emphysema, or COPD. Recently, a second type of pneumonia vaccine has become available for everyone over 65 years old. This is in addition to the previous vaccine. Ask your provider about this.  When to seek medical advice  Call your healthcare provider right away if any of these occur:    You don t get better within the first 48 hours of treatment    Shortness of breath gets worse    Rapid breathing (more than 25 breaths per minute)    Coughing up blood    Chest pain gets worse with breathing    Fever of 100.4 F (38 C) or higher that doesn t get better with fever medicine    Weakness, dizziness, or fainting that gets worse    Thirst or dry mouth that gets worse    Sinus pain, headache, or a stiff neck    Chest pain not caused by coughing  Date Last Reviewed: 1/1/2017 2000-2017 [employer]. [address] [place] [address]. All rights reserved. This information is not intended as a substitute for professional medical care. Always follow your healthcare professional's instructions.                Atrial Fibrillation    Atrial fibrillation is a condition in which the heart beats in an irregular pattern. It is caused by a problem in the heart's electrical pathways. It can be a sign of heart disease or other health problems that affect the heart.  Heart palpitations are the most common symptom of atrial fibrillation. This is the feeling that your heart is fluttering, beating fast, hard, or irregular. When the heart beats too fast, it doesn't pump blood very well. This can cause other symptoms like anxiety, fatigue, shortness of breath, chest pain, dizziness, or fainting. Atrial fibrillation may come and go. It can last from a few hours to a couple of days. Or, it may  become chronic, lasting for months at a time or even become permanent.  Atrial fibrillation may be caused by heart disease or other conditions in the body that affect the heart:    Coronary artery disease (atherosclerosis)    High blood pressure    Disease of the heart valves    Enlarged heart    Heart failure  Atrial fibrillation can also occur without heart disease because of:    Overactive thyroid (hyperthyroid)    Chronic lung disease (COPD, emphysema, bronchitis)    Heavy alcohol use    Cardiac stimulants like cocaine, amphetamines, diet pills, certain decongestant cold medicines, caffeine, or nicotine    Infection    Blood clot in the lung (pulmonary embolus)    Diabetes    Chronic kidney disease    Obesity    Extreme athletic conditioning  Treating or removing these causes will help your treatment for atrial fibrillation. It will also make it less likely for the atrial fibrillation to come back.  Atrial fibrillation can alternate back and forth with another abnormal rhythm called atrial flutter. Atrial flutter is a more regular heart rhythm and is also associated with an increased stroke risk. Proper treatment can lower your risk for stroke.  Home care  Follow these guidelines when caring for yourself at home:    Go back to your usual activities as soon as you are feeling back to normal.    If you smoke, stop smoking. Contact your healthcare provider or a local stop-smoking program for help.    Don't use stimulants like alcohol, cocaine, amphetamines, diet pills, certain decongestant cold medicines, caffeine, or nicotine.    If your provider prescribed medicine to stop atrial fibrillation from coming back, take it exactly as directed. Some medicines must be taken every day, not just when you have symptoms. This will help them work as they should.    If you were prescribed warfarin to lower your risk for stroke, have your blood tested on a regular basis as advised by your provider. This will make sure you are  getting the dose that is right for you. It also lower your risk for side effects.  Follow-up care  Follow up with your healthcare provider, or as advised.  When to seek medical advice  Call your healthcare provider right away if any of these following occur:    Shortness of breath or swelling in the legs gets worse    Unexpected weight gain    Chest pain or the sense that your heart is fluttering or beating fast or hard (palpitations)    Any sign of bleeding if you are on a blood thinner     Pain, redness, or swelling in one leg  Also call your provider right away if you have these signs of stroke:    Weakness of an arm or leg or one side of the face    Difficulty with speech or vision    Extreme drowsiness, confusion, dizziness, or fainting  Date Last Reviewed: 5/1/2016 2000-2017 [employer]. [address] [place] [address]. All rights reserved. This information is not intended as a substitute for professional medical care. Always follow your healthcare professional's instructions.                Discharge Instructions for Atrial Fibrillation  You have been diagnosed with an abnormal heart rhythm called atrial fibrillation. With this condition, your heart s 2 upper chambers quiver rather than squeeze the blood out in a normal pattern. This leads to an irregular and sometimes rapid heartbeat. Some people will develop associated symptoms such as a flip-flopping heartbeat, chest pain, lightheadedness, or shortness of breath. Other people may have no symptoms at all. Atrial fibrillation is serious because it affects the heart s ability to fill with blood as it should. Blood clots may form. This increases the risk for stroke. Untreated atrial fibrillation can also lead to heart failure. Atrial fibrillation can be controlled. With treatment, most people with atrial fibrillation lead normal lives.  Treatment options  Recommended treatment for atrial fibrillation depends on your age, symptoms,  how long you have had atrial fibrillation, and other factors. You will have a complete evaluation to find out if you have any abnormalities that caused your heart to go into atrial fibrillation. This might be blocked heart arteries or a thyroid problem. Your doctor will assess your particular case and discuss choices with you.  Treatment choices may include:    Treating an underlying disorder that puts you at risk for atrial fibrillation. For example, correcting an abnormal thyroid or electrolyte problem, or treating a blocked heart artery.    Restoring a normal heart rhythm with an electrical shock (cardioversion) or with an antiarrhythmic medicine (chemical cardioversion)    Using medicine to control your heart rate in atrial fibrillation.    Preventing the risk for blood clot and stroke using blood-thinning medicines. Your doctor will tell you what he or she recommends. Choices may include aspirin, clopidogrel, warfarin, dabigatran, rivaroxaban, apixaban, and edoxaban.    Doing catheter ablation or a surgical maze procedure. These use different methods to destroy certain areas of heart tissue. This interrupts the electrical signals causing atrial fibrillation. One of these procedures may be a choice when medicines do not work, or as an alternative to long-term medicine.    Other treatment choices may be recommended for you by your doctor.  Managing risk factors for stroke and preventing heart failure are important parts of any treatment plan for atrial fibrillation.  Home care    Take your medicines exactly as directed. Don t skip doses.    Work with your doctor to find the right medicines and doses for you.    Learn to take your own pulse. Keep a record of your results. Ask your doctor which pulse rates mean that you need medical attention. Slowing your pulse is often the goal of treatment. Ask your doctor if it s OK for you to use an automatic machine to check your pulse at home. Sometimes these machines don t  count the pulse correctly when you have atrial fibrillation.    Limit your intake of coffee, tea, cola, and other beverages with caffeine. Talk with your doctor about whether you should eliminate caffeine.    Avoid over-the-counter medicines that have caffeine in them.    Let your doctor know what medicines you take, including prescription and over-the-counter medicines, as well as any supplements. They interfere with some medicines given for atrial fibrillation.    Ask your doctor about whether you can drink alcohol. Some people need to avoid alcohol to better treat atrial fibrillation. If you are taking blood-thinner medicines, alcohol may interfere with them by increasing their effect.    Never take stimulants such as amphetamines or cocaine. These drugs can speed up your heart rate and trigger atrial fibrillation.  Follow-up care  Follow up with your doctor, or as advised.     When should I call my healthcare provider  Call your healthcare provider right away if you have any of the following:    Weakness    Dizziness    Fainting    Fatigue    Shortness of breath    Chest pain with increased activity    A change in the usual regularity of your heartbeat, or an unusually fast heartbeat   Date Last Reviewed: 4/23/2016 2000-2017 [employer]. [address] [place] [address]. All rights reserved. This information is not intended as a substitute for professional medical care. Always follow your healthcare professional's instructions.                Metoprolol tablets  Brand Name: Lopressor  What is this medicine?  METOPROLOL (me TOE proe lole) is a beta-blocker. Beta-blockers reduce the workload on the heart and help it to beat more regularly. This medicine is used to treat high blood pressure and to prevent chest pain. It is also used to after a heart attack and to prevent an additional heart attack from occurring.  How should I use this medicine?  Take this medicine by mouth with a drink of  water. Follow the directions on the prescription label. Take this medicine immediately after meals. Take your doses at regular intervals. Do not take more medicine than directed. Do not stop taking this medicine suddenly. This could lead to serious heart-related effects.  Talk to your pediatrician regarding the use of this medicine in children. Special care may be needed.  What side effects may I notice from receiving this medicine?  Side effects that you should report to your doctor or health care professional as soon as possible:    allergic reactions like skin rash, itching or hives    cold or numb hands or feet    depression    difficulty breathing    faint    fever with sore throat    irregular heartbeat, chest pain    rapid weight gain    swollen legs or ankles  Side effects that usually do not require medical attention (report to your doctor or health care professional if they continue or are bothersome):    anxiety or nervousness    change in sex drive or performance    dry skin    headache    nightmares or trouble sleeping    short term memory loss    stomach upset or diarrhea    unusually tired  What may interact with this medicine?  This medicine may interact with the following medications:    certain medicines for blood pressure, heart disease, irregular heart beat    certain medicines for depression like monoamine oxidase (MAO) inhibitors, fluoxetine, or paroxetine    clonidine    dobutamine    epinephrine    isoproterenol    reserpine  What if I miss a dose?  If you miss a dose, take it as soon as you can. If it is almost time for your next dose, take only that dose. Do not take double or extra doses.  Where should I keep my medicine?  Keep out of the reach of children.  Store at room temperature between 15 and 30 degrees C (59 and 86 degrees F). Throw away any unused medicine after the expiration date.  What should I tell my health care provider before I take this medicine?  They need to know if you  have any of these conditions:    diabetes    heart or vessel disease like slow heart rate, worsening heart failure, heart block, sick sinus syndrome or Raynaud's disease    kidney disease    liver disease    lung or breathing disease, like asthma or emphysema    pheochromocytoma    thyroid disease    an unusual or allergic reaction to metoprolol, other beta-blockers, medicines, foods, dyes, or preservatives    pregnant or trying to get pregnant    breast-feeding  What should I watch for while using this medicine?  Visit your doctor or health care professional for regular check ups. Contact your doctor right away if your symptoms worsen. Check your blood pressure and pulse rate regularly. Ask your health care professional what your blood pressure and pulse rate should be, and when you should contact them.  You may get drowsy or dizzy. Do not drive, use machinery, or do anything that needs mental alertness until you know how this medicine affects you. Do not sit or stand up quickly, especially if you are an older patient. This reduces the risk of dizzy or fainting spells. Contact your doctor if these symptoms continue. Alcohol may interfere with the effect of this medicine. Avoid alcoholic drinks.  NOTE:This sheet is a summary. It may not cover all possible information. If you have questions about this medicine, talk to your doctor, pharmacist, or health care provider. Copyright  2017 [employer]                Diltiazem extended-release capsules or tablets  Brand Names: Cardizem CD, Cardizem LA, Cardizem SR, Cartia XT, Dilacor XR, Dilt-CD, Diltia XT, Diltzac, Matzim LA, Taztia XT, Tiazac  What is this medicine?  DILTIAZEM (dil PRISCA a rosanna) is a calcium-channel blocker. It affects the amount of calcium found in your heart and muscle cells. This relaxes your blood vessels, which can reduce the amount of work the heart has to do. This medicine is used to treat high blood pressure and chest pain caused by angina.  How should  I use this medicine?  Take this medicine by mouth with a glass of water. Follow the directions on the prescription label. Swallow whole, do not crush or chew. Ask your doctor or pharmacist if your should take this medicine with food. Take your doses at regular intervals. Do not take your medicine more often then directed. Do not stop taking except on the advice of your doctor or health care professional. Ask your doctor or health care professional how to gradually reduce the dose.  Talk to your pediatrician regarding the use of this medicine in children. Special care may be needed.  What side effects may I notice from receiving this medicine?  Side effects that you should report to your doctor or health care professional as soon as possible:    allergic reactions like skin rash, itching or hives, swelling of the face, lips, or tongue    confusion, mental depression    feeling faint or lightheaded, falls    redness, blistering, peeling or loosening of the skin, including inside the mouth    slow, irregular heartbeat    swelling of the feet and ankles    unusual bleeding or bruising, pinpoint red spots on the skin  Side effects that usually do not require medical attention (report to your doctor or health care professional if they continue or are bothersome):    constipation or diarrhea    difficulty sleeping    facial flushing    headache    nausea, vomiting    sexual dysfunction    weak or tired  What may interact with this medicine?  Do not take this medicine with any of the following medications:    cisapride    hawthorn    pimozide    ranolazine    red yeast rice  This medicine may also interact with the following medications:    buspirone    carbamazepine    cimetidine    cyclosporine    digoxin    local anesthetics or general anesthetics    lovastatin    medicines for anxiety or difficulty sleeping like midazolam and triazolam    medicines for high blood pressure or heart problems    quinidine    rifampin,  rifabutin, or rifapentine  What if I miss a dose?  If you miss a dose, take it as soon as you can. If it is almost time for your next dose, take only that dose. Do not take double or extra doses.  Where should I keep my medicine?  Keep out of the reach of children.  Store at room temperature between 15 and 30 degrees C (59 and 86 degrees F). Protect from humidity. Throw away any unused medicine after the expiration date.  What should I tell my health care provider before I take this medicine?  They need to know if you have any of these conditions:    heart problems, low blood pressure, irregular heartbeat    liver disease    previous heart attack    an unusual or allergic reaction to diltiazem, other medicines, foods, dyes, or preservatives    pregnant or trying to get pregnant    breast-feeding  What should I watch for while using this medicine?  Check your blood pressure and pulse rate regularly. Ask your doctor or health care professional what your blood pressure and pulse rate should be and when you should contact him or her.  You may feel dizzy or lightheaded. Do not drive, use machinery, or do anything that needs mental alertness until you know how this medicine affects you. To reduce the risk of dizzy or fainting spells, do not sit or stand up quickly, especially if you are an older patient. Alcohol can make you more dizzy or increase flushing and rapid heartbeats. Avoid alcoholic drinks.  NOTE:This sheet is a summary. It may not cover all possible information. If you have questions about this medicine, talk to your doctor, pharmacist, or health care provider. Copyright  2017 [employer]

## 2018-02-16 NOTE — LETTER
[facility]  [address]  [place] [address]  [phone]    February 21, 2018    RE:  Rafael Josue                                                                                                                                                       [address]   [facility] [address]    To whom it may concern:    Rafael Josue has been under my professional care for 2/16/2018 - 2/21/2018 for acute medical illness.  He will need to be off work until re-assessment on his PCP visit. Scheduled to see PCP on 02/23/2018. Please call us for any questions.    Sincerely,  Marvin Spicer MD

## 2018-02-16 NOTE — IP AVS SNAPSHOT
[facility]    [address]    [facility] [address]    Phone:  [phone]                                       After Visit Summary   2/16/2018    Rafael Josue    MRN: [id number]           After Visit Summary Signature Page     I have received my discharge instructions, and my questions have been answered. I have discussed any challenges I see with this plan with the nurse or doctor.    ..........................................................................................................................................  Patient/Patient Representative Signature      ..........................................................................................................................................  Patient Representative Print Name and Relationship to Patient    ..................................................               ................................................  Date                                            Time    ..........................................................................................................................................  Reviewed by Signature/Title    ...................................................              ..............................................  Date                                                            Time

## 2018-02-16 NOTE — ED NOTES
"[facility]  ED Nurse Handoff Report    ED Chief complaint: Shortness of Breath (recent flu; )      ED Diagnosis:   Final diagnoses:   Atrial fibrillation with rapid ventricular response (H)   Pneumonia of both lungs due to infectious organism, unspecified part of lung   Influenza A       Code Status: Full Code    Allergies: No Known Allergies    Activity level - Baseline/Home:  Independent    Activity Level - Current:   Independent     Needed?: No    Isolation: Yes  Infection:Influenza    Bariatric?: No    Vital Signs:   Vitals:    02/16/18 1525 02/16/18 1536   BP: 123/74 140/85   Resp:  (!) 39   Temp: 99.9  F (37.7  C)    TempSrc: Oral    SpO2: (!) 85% 93%   Weight: 81.6 kg (180 lb)    Height: 1.753 m (5' 9\")        Cardiac Rhythm: ,   Cardiac  Cardiac Rhythm: Atrial fibrillation    Pain level: 0-10 Pain Scale: 2    Is this patient confused?: No    Patient Report: Initial Complaint: Pt comes in with SOB that started last Saturday. [facility]. He was not improving so he went to PCP today who sent him to ED for new Afib and to r/o pna.  Focused Assessment: Resp: coarse, SOB, requires 4L NC to maintain sats >90%. Cardiac: Afib on monitor with rates 110-140's. Neuro:WNL  Tests Performed: basic labs, blood cultures, cxr, trop  Abnormal Results:   Results for orders placed or performed during the hospital encounter of 02/16/18 (from the past 24 hour(s))   CBC with platelets differential   Result Value Ref Range    WBC 7.3 4.0 - 11.0 10e9/L    RBC Count 3.86 (L) 4.4 - 5.9 10e12/L    Hemoglobin 12.6 (L) 13.3 - 17.7 g/dL    Hematocrit 36.1 (L) 40.0 - 53.0 %    MCV 94 78 - 100 fl    MCH 32.6 26.5 - 33.0 pg    MCHC 34.9 31.5 - 36.5 g/dL    RDW 13.6 10.0 - 15.0 %    Platelet Count 196 150 - 450 10e9/L    Diff Method Automated Method     % Neutrophils 75.9 %    % Lymphocytes 13.4 %    % Monocytes 9.1 %    % Eosinophils 0.0 %    % Basophils 0.4 %    % Immature " Granulocytes 1.2 %    Nucleated RBCs 0 0 /100    Absolute Neutrophil 5.5 1.6 - 8.3 10e9/L    Absolute Lymphocytes 1.0 0.8 - 5.3 10e9/L    Absolute Monocytes 0.7 0.0 - 1.3 10e9/L    Absolute Eosinophils 0.0 0.0 - 0.7 10e9/L    Absolute Basophils 0.0 0.0 - 0.2 10e9/L    Abs Immature Granulocytes 0.1 0 - 0.4 10e9/L    Absolute Nucleated RBC 0.0    Basic metabolic panel   Result Value Ref Range    Sodium 131 (L) 133 - 144 mmol/L    Potassium 4.4 3.4 - 5.3 mmol/L    Chloride 97 94 - 109 mmol/L    Carbon Dioxide 25 20 - 32 mmol/L    Anion Gap 9 3 - 14 mmol/L    Glucose 122 (H) 70 - 99 mg/dL    Urea Nitrogen 24 7 - 30 mg/dL    Creatinine 0.98 0.66 - 1.25 mg/dL    GFR Estimate 76 >60 mL/min/1.7m2    GFR Estimate If Black >90 >60 mL/min/1.7m2    Calcium 8.2 (L) 8.5 - 10.1 mg/dL   Troponin I   Result Value Ref Range    Troponin I ES <0.015 0.000 - 0.045 ug/L   EKG 12-lead, tracing only   Result Value Ref Range    Interpretation ECG Click View Image link to view waveform and result    XR Chest 2 Views    Narrative    CHEST TWO VIEWS   2/16/2018 4:30 PM     HISTORY: Dyspnea, diagnosed with Influenza six days ago.    COMPARISON: None.    FINDINGS: Mild cardiomegaly. Moderately extensive opacities in the  left upper lung and right mid lung and base. These suggest pneumonia,  but are nonspecific. I do not have a previous study for comparison.      Impression    IMPRESSION: Bilateral pulmonary opacities primarily left upper lung  and right base.       Treatments provided: antibiotics    Family Comments: wife    OBS brochure/video discussed/provided to patient: No    ED Medications:   Medications   sodium chloride (PF) 0.9% PF flush 3 mL (not administered)   sodium chloride (PF) 0.9% PF flush 3 mL (not administered)   acetaminophen (TYLENOL) tablet 975 mg (not administered)   cefTRIAXone (ROCEPHIN) 2 g in 20 mL SWFI Premix Syringe (not administered)   azithromycin (ZITHROMAX) 500 mg in sodium chloride 0.9 % 250 mL intermittent  infusion (not administered)   metoprolol tartrate (LOPRESSOR) tablet 25 mg (not administered)   0.9% sodium chloride BOLUS (1,000 mLs Intravenous New Bag 2/16/18 9314)       Drips infusing?:  Yes      ED NURSE PHONE NUMBER: [phone]     '

## 2018-02-17 ENCOUNTER — APPOINTMENT (OUTPATIENT)
Dept: CARDIOLOGY | Facility: CLINIC | Age: 68
End: 2018-02-17
Attending: HOSPITALIST
Payer: COMMERCIAL

## 2018-02-17 LAB
ALBUMIN UR-MCNC: 30 MG/DL
ANION GAP SERPL CALCULATED.3IONS-SCNC: 8 MMOL/L (ref 3–14)
APPEARANCE UR: ABNORMAL
BASE EXCESS BLDV CALC-SCNC: 2 MMOL/L
BILIRUB UR QL STRIP: NEGATIVE
BUN SERPL-MCNC: 19 MG/DL (ref 7–30)
CALCIUM SERPL-MCNC: 7.8 MG/DL (ref 8.5–10.1)
CHLORIDE SERPL-SCNC: 101 MMOL/L (ref 94–109)
CHOLEST SERPL-MCNC: 66 MG/DL
CK SERPL-CCNC: 641 U/L (ref 30–300)
CO2 SERPL-SCNC: 26 MMOL/L (ref 20–32)
COLOR UR AUTO: YELLOW
CREAT SERPL-MCNC: 0.86 MG/DL (ref 0.66–1.25)
CREAT UR-MCNC: 189 MG/DL
GFR SERPL CREATININE-BSD FRML MDRD: 89 ML/MIN/1.7M2
GLUCOSE SERPL-MCNC: 134 MG/DL (ref 70–99)
GLUCOSE UR STRIP-MCNC: 30 MG/DL
GRAM STN SPEC: NORMAL
HBA1C MFR BLD: 6.3 % (ref 4.3–6)
HCO3 BLDV-SCNC: 27 MMOL/L (ref 21–28)
HDLC SERPL-MCNC: 20 MG/DL
HGB UR QL STRIP: ABNORMAL
KETONES UR STRIP-MCNC: NEGATIVE MG/DL
LACTATE BLD-SCNC: 1.4 MMOL/L (ref 0.7–2)
LDLC SERPL CALC-MCNC: 30 MG/DL
LEUKOCYTE ESTERASE UR QL STRIP: NEGATIVE
Lab: NORMAL
NITRATE UR QL: NEGATIVE
NONHDLC SERPL-MCNC: 46 MG/DL
OXYHGB MFR BLDV: 79 %
PCO2 BLDV: 42 MM HG (ref 40–50)
PH BLDV: 7.41 PH (ref 7.32–7.43)
PH UR STRIP: 6 PH (ref 5–7)
PO2 BLDV: 44 MM HG (ref 25–47)
POTASSIUM SERPL-SCNC: 4 MMOL/L (ref 3.4–5.3)
RBC #/AREA URNS AUTO: 2 /HPF (ref 0–2)
SODIUM SERPL-SCNC: 135 MMOL/L (ref 133–144)
SODIUM UR-SCNC: <5 MMOL/L
SOURCE: ABNORMAL
SP GR UR STRIP: 1.02 (ref 1–1.03)
SPECIMEN SOURCE: NORMAL
SQUAMOUS #/AREA URNS AUTO: <1 /HPF (ref 0–1)
TRIGL SERPL-MCNC: 82 MG/DL
UROBILINOGEN UR STRIP-MCNC: NORMAL MG/DL (ref 0–2)
WBC #/AREA URNS AUTO: 4 /HPF (ref 0–2)

## 2018-02-17 PROCEDURE — 83036 HEMOGLOBIN GLYCOSYLATED A1C: CPT | Performed by: HOSPITALIST

## 2018-02-17 PROCEDURE — 83605 ASSAY OF LACTIC ACID: CPT | Performed by: HOSPITALIST

## 2018-02-17 PROCEDURE — 40000275 ZZH STATISTIC RCP TIME EA 10 MIN

## 2018-02-17 PROCEDURE — 25000128 H RX IP 250 OP 636: Performed by: HOSPITALIST

## 2018-02-17 PROCEDURE — 25000128 H RX IP 250 OP 636

## 2018-02-17 PROCEDURE — 27210995 ZZH RX 272: Performed by: HOSPITALIST

## 2018-02-17 PROCEDURE — 81001 URINALYSIS AUTO W/SCOPE: CPT | Performed by: HOSPITALIST

## 2018-02-17 PROCEDURE — 25000128 H RX IP 250 OP 636: Performed by: INTERNAL MEDICINE

## 2018-02-17 PROCEDURE — 80048 BASIC METABOLIC PNL TOTAL CA: CPT | Performed by: HOSPITALIST

## 2018-02-17 PROCEDURE — 99233 SBSQ HOSP IP/OBS HIGH 50: CPT | Performed by: HOSPITALIST

## 2018-02-17 PROCEDURE — 84300 ASSAY OF URINE SODIUM: CPT | Performed by: HOSPITALIST

## 2018-02-17 PROCEDURE — 82550 ASSAY OF CK (CPK): CPT | Performed by: HOSPITALIST

## 2018-02-17 PROCEDURE — 21000000 ZZH R&B IMCU HEART CARE

## 2018-02-17 PROCEDURE — 82805 BLOOD GASES W/O2 SATURATION: CPT | Performed by: HOSPITALIST

## 2018-02-17 PROCEDURE — 36415 COLL VENOUS BLD VENIPUNCTURE: CPT | Performed by: HOSPITALIST

## 2018-02-17 PROCEDURE — 25500064 ZZH RX 255 OP 636: Performed by: HOSPITALIST

## 2018-02-17 PROCEDURE — 40000884 ZZH STATISTIC STEP DOWN HRS NIGHT

## 2018-02-17 PROCEDURE — 94640 AIRWAY INHALATION TREATMENT: CPT | Mod: 76

## 2018-02-17 PROCEDURE — 80061 LIPID PANEL: CPT | Performed by: HOSPITALIST

## 2018-02-17 PROCEDURE — 25000132 ZZH RX MED GY IP 250 OP 250 PS 637: Performed by: HOSPITALIST

## 2018-02-17 PROCEDURE — 25000125 ZZHC RX 250: Performed by: HOSPITALIST

## 2018-02-17 PROCEDURE — 93306 TTE W/DOPPLER COMPLETE: CPT | Mod: 26 | Performed by: INTERNAL MEDICINE

## 2018-02-17 RX ORDER — FUROSEMIDE 10 MG/ML
20 INJECTION INTRAMUSCULAR; INTRAVENOUS ONCE
Status: COMPLETED | OUTPATIENT
Start: 2018-02-17 | End: 2018-02-17

## 2018-02-17 RX ORDER — FUROSEMIDE 10 MG/ML
INJECTION INTRAMUSCULAR; INTRAVENOUS
Status: COMPLETED
Start: 2018-02-17 | End: 2018-02-17

## 2018-02-17 RX ORDER — FUROSEMIDE 20 MG
20 TABLET ORAL ONCE
Status: COMPLETED | OUTPATIENT
Start: 2018-02-17 | End: 2018-02-17

## 2018-02-17 RX ORDER — MULTIPLE VITAMINS W/ MINERALS TAB 9MG-400MCG
1 TAB ORAL DAILY
Status: DISCONTINUED | OUTPATIENT
Start: 2018-02-17 | End: 2018-02-21 | Stop reason: HOSPADM

## 2018-02-17 RX ADMIN — IPRATROPIUM BROMIDE AND ALBUTEROL SULFATE 3 ML: .5; 3 SOLUTION RESPIRATORY (INHALATION) at 12:28

## 2018-02-17 RX ADMIN — OSELTAMIVIR PHOSPHATE 75 MG: 75 CAPSULE ORAL at 20:07

## 2018-02-17 RX ADMIN — IPRATROPIUM BROMIDE AND ALBUTEROL SULFATE 3 ML: .5; 3 SOLUTION RESPIRATORY (INHALATION) at 19:51

## 2018-02-17 RX ADMIN — METOPROLOL TARTRATE 25 MG: 25 TABLET ORAL at 10:43

## 2018-02-17 RX ADMIN — GUAIFENESIN AND DEXTROMETHORPHAN 10 ML: 100; 10 SYRUP ORAL at 02:16

## 2018-02-17 RX ADMIN — FUROSEMIDE 20 MG: 20 TABLET ORAL at 08:53

## 2018-02-17 RX ADMIN — ENOXAPARIN SODIUM 40 MG: 40 INJECTION SUBCUTANEOUS at 20:07

## 2018-02-17 RX ADMIN — METOPROLOL TARTRATE 25 MG: 25 TABLET ORAL at 02:14

## 2018-02-17 RX ADMIN — FUROSEMIDE: 10 INJECTION, SOLUTION INTRAVENOUS at 05:45

## 2018-02-17 RX ADMIN — CEFTRIAXONE 2 G: 10 INJECTION, POWDER, FOR SOLUTION INTRAVENOUS at 20:07

## 2018-02-17 RX ADMIN — GUAIFENESIN AND DEXTROMETHORPHAN 10 ML: 100; 10 SYRUP ORAL at 05:42

## 2018-02-17 RX ADMIN — AZITHROMYCIN MONOHYDRATE 250 MG: 500 INJECTION, POWDER, LYOPHILIZED, FOR SOLUTION INTRAVENOUS at 18:08

## 2018-02-17 RX ADMIN — MULTIPLE VITAMINS W/ MINERALS TAB 1 TABLET: TAB at 08:53

## 2018-02-17 RX ADMIN — IPRATROPIUM BROMIDE AND ALBUTEROL SULFATE 3 ML: .5; 3 SOLUTION RESPIRATORY (INHALATION) at 05:17

## 2018-02-17 RX ADMIN — IPRATROPIUM BROMIDE AND ALBUTEROL SULFATE 3 ML: .5; 3 SOLUTION RESPIRATORY (INHALATION) at 16:13

## 2018-02-17 RX ADMIN — OSELTAMIVIR PHOSPHATE 75 MG: 75 CAPSULE ORAL at 08:53

## 2018-02-17 RX ADMIN — FUROSEMIDE 20 MG: 10 INJECTION, SOLUTION INTRAVENOUS at 05:43

## 2018-02-17 RX ADMIN — METOPROLOL TARTRATE 25 MG: 25 TABLET ORAL at 18:08

## 2018-02-17 RX ADMIN — ASPIRIN 81 MG 81 MG: 81 TABLET ORAL at 08:53

## 2018-02-17 RX ADMIN — SULFUR HEXAFLUORIDE 2 ML: KIT at 16:51

## 2018-02-17 NOTE — PROGRESS NOTES
[facility]  Hospitalist Progress Note   02/17/2018          Assessment and Plan:       Rafael Josue is a 67 year old male with history of hyperlipidemia [not on medications] mild polycythemia admitted on 12/16/2018 with ongoing shortness of breath.      Acute hypoxic respiratory failure requiring supplemental oxygen from pneumonia.  Community acquired pneumonia at-risk for bacterial organisms.  Symptoms have been ongoing for 10 days now. Progressively worsening shortness of breath, worse on minimal ambulation. Associated cough with minimal white colored phlegm. Has been recently diagnosed with influenza on 2/12.  In the ED, on presentation oxygen saturation's of 85% on room air, improved to 93% with 3 lts, with coarse breath sounds. WBC count of 7.3 Lactic acid 1.3. Pro calcitonin 4.01.. 7.41, PCO2 42. Chest x-ray showed bilateral pulmonary opacities primarily in the left upper lung and right base.   Blood cultures were collected and he was given normal saline bolus, 2 g IV Rocephin, 500 mg of IV azithromycin and Tylenol.  Continue ceftriaxone 1 g Q 12, azithromycin 500 mg daily []  supplemental nasal oxygen to keep saturations about 90%. Consider BiPAP if any decompensation.  Follow blood culture results. No growth to date  Sputum cultures results pending.  Intermittent duo neb nebulizers. [Given smoking history]  will consider CT imaging if continues to have shortness of breath or any decompensation  aggressive incentives spirometry.     New onset atrial fibrillation with rapid ventricular response-likely in the setting of pneumonia/respiratory decompensation.  Elevated Pro BNP/probable heart failure  Patient denies any chest pain or palpitations. Not have a history of atrial fibrillation  Noted to be in atrial fibrillation with heart rates of 110,  sodium 135, Troponin less than 0.015.magnesium 2.6  TSH of 0.13, Free T4 1.50.  Continue aspirin 81 mg daily. [Started on 2/16]  continue  metoprolol 25 mg every eight hours [heart rate between 100 - 110)  discontinue IV fluids. Oral Lasix for diuresis.  Strict input output monitoring, low-salt diet. Daily weights.  Telemetry monitoring  echocardiogram for evaluation of heart function  Will consider IV metoprolol/Cardizem drip if heartrate greater than 140 or patient complains of symptoms.     Influenza Upper respiratory infection   was diagnosed with influenza on 2/11/2018 at the [facility].  Has been on Tamiflu.  Will treat for total of seven days given superimposed pneumonia.  Droplet precautions.   Emphasized frequent handwashing and adequate oral hydration.  Supportive care with lozenges, nasal drops, Robitussin.     Mild hyponatremia from poor oral intake-improved  mild rhabdomyolysis likely from acute illness/ flu  Patient states poor oral intake in the last few days.  , lipid panel HDL 20, LDL 30  adequate oral hydration.  Discontinue IV fluids given elevated Pro BNP/probable heart failure.     Supressed TSH likely from acute illness  TSH of 0.13, Free T4 slightly elevated at 1.50.  Recheck thyroid function tests in 4 to 6 weeks.     History of hyperlipidemia not on medications  has HDL of 20, LDL 30. Total cholesterol 66     Nicotine dependence  patient smokes have to one pack a day.  Declined nicotine patch.  Counseled on considering smoking cessation.    Prediabetes with hemoglobin A1c of 6.3.  Monitor blood sugars periodically    # Pain Assessment:   Current Pain Score 2/17/2018 2/16/2018 2/16/2018   Patient currently in pain? denies denies denies   Pain score (0-10) - - -   Rafael bedoya pain level was assessed and he currently denies pain.        Active Diet Order      Combination Diet Low Saturated Fat Na <2400mg Diet, No Caffeine Diet    DVT Prophylaxis: Enoxaparin (Lovenox) SQ  Code Status: Full Code     Disposition: Expected discharge in 2-3 pending clinical improvement    Patient, interdisciplinary team involved in care and  agrees with plan.  Total time - Greater than 35 min. More than 50% of time spent in direct patient care, care coordination and formalizing plan of care.     Marvin Spicer MD        Interval History:      Patient lying in bed. Denies chest pain, continues to complain of shortness of breath.  Has been requiring 4 L nasal oxygen.   Has had elevated Pro BNP, IV fluids discontinued, received dose of Lasix overnight.       Physical Exam:        Physical Exam   Temp:  [97.8  F (36.6  C)-100.2  F (37.9  C)] 97.8  F (36.6  C)  Heart Rate:  [] 117  Resp:  [11-42] 14  BP: ()/() 119/76  SpO2:  [85 %-98 %] 91 %    Intake/Output Summary (Last 24 hours) at 02/17/18 0838  Last data filed at 02/17/18 0700   Gross per 24 hour   Intake              300 ml   Output             1200 ml   Net             -900 ml     Admission Weight: 81.6 kg (180 lb)  Current Weight: 83.5 kg (184 lb 1.6 oz)    PHYSICAL EXAM  HEENT: PERRLA, no frontal or maxillary sinus tenderness  Neck:  no JVD appreciated  Chest: Chest wall non-tender to palpation  Lungs: bilateral coarse breath sounds, no wheezing.  CV: irregular S1 and S2. No murmurs  Abdomen: Soft, non-tender, non-distended. BS active.  Extremities: trace peripheral edema  Neuro: Alert and oriented. CN II-XII intact. Moving all extremities equally. No focal deficits         Medications:          multivitamin, therapeutic with minerals  1 tablet Oral Daily     furosemide  20 mg Oral Once     sodium chloride (PF)  3 mL Intracatheter Q8H     metoprolol tartrate  25 mg Oral Q8H     cefTRIAXone  2 g Intravenous Q24H     azithromycin  250 mg Intravenous Q24H     ipratropium - albuterol 0.5 mg/2.5 mg/3 mL  3 mL Nebulization 4x Daily     aspirin  81 mg Oral Daily     enoxaparin  40 mg Subcutaneous Q24H     oseltamivir  75 mg Oral BID     sodium chloride (PF), nitroGLYcerin, guaiFENesin-dextromethorphan, sore throat lozenge, sodium chloride, lidocaine visc 2% & maalox/mylanta  w/simethicone & diphenhydramine, naloxone, melatonin, potassium chloride, potassium chloride, potassium chloride, potassium chloride with lidocaine, potassium chloride, magnesium sulfate, acetaminophen, acetaminophen, HYDROcodone-acetaminophen, morphine, senna-docusate **OR** senna-docusate, polyethylene glycol, bisacodyl, ondansetron **OR** ondansetron, metoprolol         Data:      All new lab and imaging data was reviewed.   EKG: atrial fibrillation, heart rate 117,     Telemetry:   atrial fibrillation with rate control.    Results for orders placed or performed during the hospital encounter of 02/16/18 (from the past 24 hour(s))   CBC with platelets differential   Result Value Ref Range    WBC 7.3 4.0 - 11.0 10e9/L    RBC Count 3.86 (L) 4.4 - 5.9 10e12/L    Hemoglobin 12.6 (L) 13.3 - 17.7 g/dL    Hematocrit 36.1 (L) 40.0 - 53.0 %    MCV 94 78 - 100 fl    MCH 32.6 26.5 - 33.0 pg    MCHC 34.9 31.5 - 36.5 g/dL    RDW 13.6 10.0 - 15.0 %    Platelet Count 196 150 - 450 10e9/L    Diff Method Automated Method     % Neutrophils 75.9 %    % Lymphocytes 13.4 %    % Monocytes 9.1 %    % Eosinophils 0.0 %    % Basophils 0.4 %    % Immature Granulocytes 1.2 %    Nucleated RBCs 0 0 /100    Absolute Neutrophil 5.5 1.6 - 8.3 10e9/L    Absolute Lymphocytes 1.0 0.8 - 5.3 10e9/L    Absolute Monocytes 0.7 0.0 - 1.3 10e9/L    Absolute Eosinophils 0.0 0.0 - 0.7 10e9/L    Absolute Basophils 0.0 0.0 - 0.2 10e9/L    Abs Immature Granulocytes 0.1 0 - 0.4 10e9/L    Absolute Nucleated RBC 0.0    Basic metabolic panel   Result Value Ref Range    Sodium 131 (L) 133 - 144 mmol/L    Potassium 4.4 3.4 - 5.3 mmol/L    Chloride 97 94 - 109 mmol/L    Carbon Dioxide 25 20 - 32 mmol/L    Anion Gap 9 3 - 14 mmol/L    Glucose 122 (H) 70 - 99 mg/dL    Urea Nitrogen 24 7 - 30 mg/dL    Creatinine 0.98 0.66 - 1.25 mg/dL    GFR Estimate 76 >60 mL/min/1.7m2    GFR Estimate If Black >90 >60 mL/min/1.7m2    Calcium 8.2 (L) 8.5 - 10.1 mg/dL   Troponin I    Result Value Ref Range    Troponin I ES <0.015 0.000 - 0.045 ug/L   Blood culture   Result Value Ref Range    Specimen Description Blood Right Arm     Special Requests Aerobic and anaerobic bottles received     Culture Micro No growth after 10 hours    CRP inflammation   Result Value Ref Range    CRP Inflammation 430.0 (H) 0.0 - 8.0 mg/L   Magnesium   Result Value Ref Range    Magnesium 2.6 (H) 1.6 - 2.3 mg/dL   Procalcitonin   Result Value Ref Range    Procalcitonin 4.01 ng/ml   TSH with free T4 reflex   Result Value Ref Range    TSH 0.13 (L) 0.40 - 4.00 mU/L   T4 free   Result Value Ref Range    T4 Free 1.50 (H) 0.76 - 1.46 ng/dL   Nt probnp inpatient   Result Value Ref Range    N-Terminal Pro BNP Inpatient 6210 (H) 0 - 900 pg/mL   EKG 12-lead, tracing only   Result Value Ref Range    Interpretation ECG Click View Image link to view waveform and result    XR Chest 2 Views    Narrative    CHEST TWO VIEWS   2/16/2018 4:30 PM     HISTORY: Dyspnea, diagnosed with Influenza six days ago.    COMPARISON: None.    FINDINGS: Mild cardiomegaly. Moderately extensive opacities in the  left upper lung and right mid lung and base. These suggest pneumonia,  but are nonspecific. I do not have a previous study for comparison.      Impression    IMPRESSION: Bilateral pulmonary opacities primarily left upper lung  and right base.    BEAU MILLS MD   Blood culture   Result Value Ref Range    Specimen Description Blood Right Arm     Special Requests Aerobic and anaerobic bottles received     Culture Micro No growth after 9 hours    Lactic acid   Result Value Ref Range    Lactic Acid 1.3 0.7 - 2.0 mmol/L   Gram stain   Result Value Ref Range    Specimen Description Sputum     Special Requests Screen     Gram Stain <10 Squamous epithelial cells/low power field     Gram Stain >25 PMNs/low power field     Gram Stain       Moderate  Mixed gram positive and gram negative bacteria present.     UA with Microscopic reflex to Culture    Result Value Ref Range    Color Urine Yellow     Appearance Urine Slightly Cloudy     Glucose Urine 30 (A) NEG^Negative mg/dL    Bilirubin Urine Negative NEG^Negative    Ketones Urine Negative NEG^Negative mg/dL    Specific Gravity Urine 1.021 1.003 - 1.035    Blood Urine Trace (A) NEG^Negative    pH Urine 6.0 5.0 - 7.0 pH    Protein Albumin Urine 30 (A) NEG^Negative mg/dL    Urobilinogen mg/dL Normal 0.0 - 2.0 mg/dL    Nitrite Urine Negative NEG^Negative    Leukocyte Esterase Urine Negative NEG^Negative    Source Midstream Urine     WBC Urine 4 (H) 0 - 2 /HPF    RBC Urine 2 0 - 2 /HPF    Squamous Epithelial /HPF Urine <1 0 - 1 /HPF   Lactic acid level STAT   Result Value Ref Range    Lactic Acid 1.4 0.7 - 2.0 mmol/L   Sodium random urine   Result Value Ref Range    Sodium Urine mmol/L <5 mmol/L   Creatinine urine calculation only   Result Value Ref Range    Creatinine Urine 189 mg/dL   Lipid panel reflex to direct LDL   Result Value Ref Range    Cholesterol 66 <200 mg/dL    Triglycerides 82 <150 mg/dL    HDL Cholesterol 20 (L) >39 mg/dL    LDL Cholesterol Calculated 30 <100 mg/dL    Non HDL Cholesterol 46 <130 mg/dL   Hemoglobin A1c   Result Value Ref Range    Hemoglobin A1C 6.3 (H) 4.3 - 6.0 %   Basic metabolic panel   Result Value Ref Range    Sodium 135 133 - 144 mmol/L    Potassium 4.0 3.4 - 5.3 mmol/L    Chloride 101 94 - 109 mmol/L    Carbon Dioxide 26 20 - 32 mmol/L    Anion Gap 8 3 - 14 mmol/L    Glucose 134 (H) 70 - 99 mg/dL    Urea Nitrogen 19 7 - 30 mg/dL    Creatinine 0.86 0.66 - 1.25 mg/dL    GFR Estimate 89 >60 mL/min/1.7m2    GFR Estimate If Black >90 >60 mL/min/1.7m2    Calcium 7.8 (L) 8.5 - 10.1 mg/dL   Blood gas venous with oxyhemoglobin   Result Value Ref Range    Ph Venous 7.41 7.32 - 7.43 pH    PCO2 Venous 42 40 - 50 mm Hg    PO2 Venous 44 25 - 47 mm Hg    Bicarbonate Venous 27 21 - 28 mmol/L    Oxyhemoglobin Venous 79 %    Base Excess Venous 2.0 mmol/L   CK total   Result Value Ref Range     CK Total 641 (H) 30 - 300 U/L

## 2018-02-17 NOTE — PLAN OF CARE
Problem: Patient Care Overview  Goal: Plan of Care/Patient Progress Review  Outcome: No Change  vss except HR in low 100's at rest and 120's when active.  [place].  Respiratory therapy called and gave neb without relief from wheezes and respiratory effort.  Dr De La O notified and IV fluids stopped , lasix 20mg iv given and robitussin given for cough.    Lungs are coarse throughout with expiratory wheezes. Good uo from lasix.remains on O2 at 4L nasal cannula.

## 2018-02-17 NOTE — PROVIDER NOTIFICATION
Brief update:    Regarding wheezing, increased work of breathing. No response to nebulizer treatments    20 IV Lasix ×1, normal saline infusion discontinued. Given oxygen demands in the setting of pneumonia, A. fib with RVR, wonder if there is some additional interval development of rate related heart failure. Currently with heart rates in the 105, though intermittently in the 130s range by review of chart data.    Kevin De La O MD  5:38 AM

## 2018-02-17 NOTE — PROGRESS NOTES
SPIRITUAL HEALTH SERVICES Progress Note  [facility]    Initial visit per pt request.  Pt asked for HCD form and education re: its contents.  SH brought [employer] HCD form and explained each component of it.  Pt states intention to complete it today.  Pt's spouse was present.    Following pt's completion of HCD, SH or unit staff will need to secure a notary from the hospital to make it legal.                                                                                                                                           Hiram Harris M.Div., [facility]  Staff   Pager [phone]

## 2018-02-17 NOTE — PHARMACY-ADMISSION MEDICATION HISTORY
Admission medication history interview status for the 2/16/2018  admission is complete. See EPIC admission navigator for prior to admission medications     Medication history source reliability:Good    Actions taken by pharmacist (provider contacted, etc):None     Additional medication history information not noted on PTA med list :None    Medication reconciliation/reorder completed by provider prior to medication history? No    Time spent in this activity: 5 minutes    Prior to Admission medications    Medication Sig Last Dose Taking? Auth Provider   Multiple Vitamins-Minerals (MULTI FOR HIM) PACK Take 1 packet by mouth daily Patient takes a pack of vitamins a day. Includes Vitamin A,E,D,C & B Vitamins. Patient does not know exact amounts of vitamins.) 2/15/2018 at Unknown time Yes Unknown, Entered By History

## 2018-02-17 NOTE — H&P
[facility]    History and Physical  Hospitalist    Rafael Josue MRN# [id number]   Age: 67 year old YOB: 1950     Date of Admission:  2/16/2018    Primary care provider: Marty Villegas And          Assessment and Plan:     Rafael Josue is a 67 year old male with history of hyperlipidemia [not on medications] mild polycythemia presented to the ED with ongoing shortness of breath.     Acute hypoxic respiratory failure requiring supplemental oxygen from pneumonia.  Community acquired pneumonia at-risk for bacterial organisms.  Patient states the symptoms have been ongoing for 10 days now. Progressively worsening shortness of breath, worse on minimal ambulation. Associated cough with minimal white colored phlegm. Has been recently diagnosed with influenza.  In the ED, on presentation oxygen saturation's of 85% on room air, improved to 93% with 3 lts, with coarse breath sounds. WBC count of 7.3 Lactic acid 1.3. Chest x-ray showed bilateral pulmonary opacities primarily in the left upper lung and right base.   Blood cultures were collected and he was given normal saline bolus, 2 g IV Rocephin, 500 mg of IV azithromycin and Tylenol.  Continue ceftriaxone 1 g Q 12, azithromycin 500 mg daily []  supplemental nasal oxygen to keep saturations about 90%. Consider BiPAP if any decompensation.  Follow blood culture results.  Sent sputum cultures.  Add on Pro calcitonin.  Intermittent duo neb nebulizers. [Given smoking history]  will consider CT imaging if continues to have shortness of breath or any decompensation  aggressive incentives spirometry.    New onset atrial fibrillation with rapid ventricular response-likely in the setting of pneumonia/respiratory decompensation.  Patient denies any chest pain or palpitations. Not have a history of atrial fibrillation  Noted to be in atrial fibrillation with heart rates of 110,  sodium 13, Troponin less than  0.015.  Check TSH, magnesium.  Stat dose of aspirin milligrams.  Telemetry monitoring  echocardiogram for evaluation of heart function  Will start on metoprolol 25 mg every eight hours [heart rate between 100 - 110)  Will consider IV metoprolol/Cardizem drip if heartrate greater than 140 or patient complains of symptoms.    Influenza Upper respiratory infection   was diagnosed with pneumonia on 2/11/2018.  Has been on Tamiflu.  Will treat for total of seven days given superimposed pneumonia.  Droplet precautions.   Emphasized frequent handwashing and adequate oral hydration.  Supportive care with lozenges, nasal drops, Robitussin.    Mild hyponatremia from poor oral intake-  Patient states poor oral intake in the last few days.  Check CPK, lipid panel, Pro BNP urine sodium.  NS at hundred ML per hour overnight.    History of hyperlipidemia not on medications  Lipid panel a.m.  Will consider statin if elevated lipids.    Nicotine dependence  patient smokes have to one pack a day.  Declined nicotine patch.  Counseled on considering smoking cessation.    # Pain Assessment:   Current Pain Score 2/16/2018   Pain score (0-10) 2   Rafael s pain level was assessed and he currently denies pain.      DVT Prophylaxis: Enoxaparin (Lovenox) SQ  Code Status: Full Code    Disposition: Expected discharge in 2-3 pending clinical improvement  Patient, his wife, interdisciplinary team involved in care and agrees with plan.    Total time - greater than 40 minutes. More than 50% of time spent in direct patient care, care coordination, patient/caregiver counseling, and formalizing plan of care.    Marvin Spicer MD          Chief Complaint:     History is obtained from the patient and his wife    Rafael Josue is a 67 year old male with history of hyperlipidemia [not on medications] mild polycythemia presented to the ED with ongoing shortness of breath. Patient states the symptoms have been ongoing for 10 days now. Progressively  worsening shortness of breath, worse on minimal ambulation. Associated cough with minimal white colored phlegm. No associated chest pain or palpitations. Admits to subjective fevers. No difficulty passing urine. No nausea or vomiting. No diarrhea or constipation. Denies any blood in sputum.  [facility] and treated with Tamiflu. [Today is Day five.  Has been exposed to his wife who had flew two weeks back. He has traveled to [place] two weeks back.he has been to the clinic for ongoing shortness of breath and scented to the ED for evaluation.  Denies any previous history of atrial fibrillation, family history of heart disease.  Does not take any medications at home.    In the ED, on presentation oxygen saturation's of 85% on room air, improved to 93% with 3 lts, with coarse breath sounds.  Noted to be in atrial fibrillation with heart rates of 110, chest x-ray showed bilateral pulmonary opacities primarily in the left upper lung and right base. WBC count of 7.3, sodium 131. Lactic acid 1.3. Troponin less than 0.015. Blood cultures were collected and he was given normal saline bolus, 2 g IV Rocephin, 500 mg of IV azithromycin and Tylenol.         Past Medical History:   Former smoker  Overweight  Adenomatous Polyp of descending colon    Carpal tunnel syndrome  Polycythemia - mild   Hyperlipidemia  Crushing injury to left elbow  Inguinal hernia         Past Surgical History:    ORIF left elbow          Social History:   patient is been accompanied by his wife to the ED  smokes have to one pack a day.  Occasional alcohol use.  Lives at home with his wife.         Family History:   No family history of heart disease.  No pertinent family history. History reviewed         Allergies:   No Known Allergies          Medications:   does not take medications at home.         Review of Systems:   REVIEW OF SYSTEMS   GENERAL: No weight gain or loss, has subjective  fever  EENT: No vision changes, no difficulty swallowing  PULMONARY: see history of present illness  CARDIAC: no chest pain, no chest pressure, no rapid beating, no irregular beating,  GI: No abdominal pain, nausea, vomiting, diarrhea, constipation, black or bloody stools  : No burning/pain with urination  NEURO: No significant headaches, no slurred speech, no seizures  ENDOCRINE: No excessive thirst, no excessive bruising.  MUSCULOSKELETAL: No joint pain or swelling.  SKIN: No skin rashes  PSYCHIATRY no anxiety or depression         Physical Exam (Resident / Clinician):     Admission Weight: 81.6 kg (180 lb)  Current Weight: 81.6 kg (180 lb)    Vital Signs with Ranges  Temp:  [99.9  F (37.7  C)] 99.9  F (37.7  C)  Heart Rate:  [120-127] 120  Resp:  [39] 39  BP: (123-140)/(74-85) 140/85  SpO2:  [85 %-93 %] 93 %    General: NAD  HEENT: PERRLA, no frontal or maxillary sinus tenderness  Neck: No lymphadenopathy, no JVD appreciated  Chest: Chest wall non-tender to palpation  Lungs: bilateral coarse breath sounds, no wheezing.  CV: irregular S1 and S2. No murmurs  Abdomen: Soft, non-tender, non-distended. BS active.  Extremities: No peripheral edema  Skin: Warm, dry, well-perfused  Neuro: Alert and oriented. CN II-XII intact. Moving all extremities equally. No focal deficits         Data:   All new lab and imaging data was reviewed.   EKG: atrial fibrillation, heart rate 117,     Results for orders placed or performed during the hospital encounter of 02/16/18 (from the past 24 hour(s))   CBC with platelets differential   Result Value Ref Range    WBC 7.3 4.0 - 11.0 10e9/L    RBC Count 3.86 (L) 4.4 - 5.9 10e12/L    Hemoglobin 12.6 (L) 13.3 - 17.7 g/dL    Hematocrit 36.1 (L) 40.0 - 53.0 %    MCV 94 78 - 100 fl    MCH 32.6 26.5 - 33.0 pg    MCHC 34.9 31.5 - 36.5 g/dL    RDW 13.6 10.0 - 15.0 %    Platelet Count 196 150 - 450 10e9/L    Diff Method Automated Method     % Neutrophils 75.9 %    % Lymphocytes 13.4 %    %  Monocytes 9.1 %    % Eosinophils 0.0 %    % Basophils 0.4 %    % Immature Granulocytes 1.2 %    Nucleated RBCs 0 0 /100    Absolute Neutrophil 5.5 1.6 - 8.3 10e9/L    Absolute Lymphocytes 1.0 0.8 - 5.3 10e9/L    Absolute Monocytes 0.7 0.0 - 1.3 10e9/L    Absolute Eosinophils 0.0 0.0 - 0.7 10e9/L    Absolute Basophils 0.0 0.0 - 0.2 10e9/L    Abs Immature Granulocytes 0.1 0 - 0.4 10e9/L    Absolute Nucleated RBC 0.0    Basic metabolic panel   Result Value Ref Range    Sodium 131 (L) 133 - 144 mmol/L    Potassium 4.4 3.4 - 5.3 mmol/L    Chloride 97 94 - 109 mmol/L    Carbon Dioxide 25 20 - 32 mmol/L    Anion Gap 9 3 - 14 mmol/L    Glucose 122 (H) 70 - 99 mg/dL    Urea Nitrogen 24 7 - 30 mg/dL    Creatinine 0.98 0.66 - 1.25 mg/dL    GFR Estimate 76 >60 mL/min/1.7m2    GFR Estimate If Black >90 >60 mL/min/1.7m2    Calcium 8.2 (L) 8.5 - 10.1 mg/dL   Troponin I   Result Value Ref Range    Troponin I ES <0.015 0.000 - 0.045 ug/L   EKG 12-lead, tracing only   Result Value Ref Range    Interpretation ECG Click View Image link to view waveform and result    XR Chest 2 Views    Narrative    CHEST TWO VIEWS   2/16/2018 4:30 PM     HISTORY: Dyspnea, diagnosed with Influenza six days ago.    COMPARISON: None.    FINDINGS: Mild cardiomegaly. Moderately extensive opacities in the  left upper lung and right mid lung and base. These suggest pneumonia,  but are nonspecific. I do not have a previous study for comparison.      Impression    IMPRESSION: Bilateral pulmonary opacities primarily left upper lung  and right base.   Lactic acid   Result Value Ref Range    Lactic Acid 1.3 0.7 - 2.0 mmol/L

## 2018-02-17 NOTE — PLAN OF CARE
Problem: Patient Care Overview  Goal: Plan of Care/Patient Progress Review  Monitor remains atrial fib with RVR. Pt. Is KRISHNAMURTHY. Stable O2 sats with O2 at 4 L per NC. Pt. Denies pain. Continue with antibiotics,nebs. Spouse at bedside.

## 2018-02-17 NOTE — PLAN OF CARE
Problem: Pneumonia (Adult)  Goal: Signs and Symptoms of Listed Potential Problems Will be Absent, Minimized or Managed (Pneumonia)  Signs and symptoms of listed potential problems will be absent, minimized or managed by discharge/transition of care (reference Pneumonia (Adult) CPG).  Outcome: No Change  [place] [facility] [place] [facility] [place]  Pt denies any pain but does have shortness of breath with congested, nonproductive cough.  SPO2 low to mid 90's on 4L/NC.  Pt has also received scheduled neb treatment.  Lungs continue to sound coarse.  Pt informed that a sputum culture is needed. Pt currently in Afib with CVR and vitals remain stable.  Fever noted when in ED and has since decreased.  No further issues noted. Pt alert, oriented and able to initiate needs appropriately.

## 2018-02-17 NOTE — PROGRESS NOTES
Responded to call from RN, pt wheezing/RR increasing. Administered duoneb with no improvement, RN notified.  SpO2 92% on 4 lpm. Will continue to follow.     Mark Hager RT

## 2018-02-18 LAB
ANION GAP SERPL CALCULATED.3IONS-SCNC: 8 MMOL/L (ref 3–14)
BUN SERPL-MCNC: 19 MG/DL (ref 7–30)
CALCIUM SERPL-MCNC: 8 MG/DL (ref 8.5–10.1)
CHLORIDE SERPL-SCNC: 100 MMOL/L (ref 94–109)
CO2 SERPL-SCNC: 26 MMOL/L (ref 20–32)
CREAT SERPL-MCNC: 0.8 MG/DL (ref 0.66–1.25)
GFR SERPL CREATININE-BSD FRML MDRD: >90 ML/MIN/1.7M2
GLUCOSE SERPL-MCNC: 124 MG/DL (ref 70–99)
LACTATE BLD-SCNC: 1.2 MMOL/L (ref 0.7–2)
POTASSIUM SERPL-SCNC: 4.1 MMOL/L (ref 3.4–5.3)
SODIUM SERPL-SCNC: 134 MMOL/L (ref 133–144)

## 2018-02-18 PROCEDURE — 40000884 ZZH STATISTIC STEP DOWN HRS NIGHT

## 2018-02-18 PROCEDURE — 40000275 ZZH STATISTIC RCP TIME EA 10 MIN

## 2018-02-18 PROCEDURE — 40000885 ZZH STATISTIC STEP DOWN HRS EVENING

## 2018-02-18 PROCEDURE — 36415 COLL VENOUS BLD VENIPUNCTURE: CPT | Performed by: HOSPITALIST

## 2018-02-18 PROCEDURE — 80048 BASIC METABOLIC PNL TOTAL CA: CPT | Performed by: HOSPITALIST

## 2018-02-18 PROCEDURE — 21000000 ZZH R&B IMCU HEART CARE

## 2018-02-18 PROCEDURE — 99207 ZZC NO CHARGE VISIT/PATIENT NOT SEEN: CPT | Performed by: INTERNAL MEDICINE

## 2018-02-18 PROCEDURE — 25000132 ZZH RX MED GY IP 250 OP 250 PS 637: Performed by: HOSPITALIST

## 2018-02-18 PROCEDURE — 36415 COLL VENOUS BLD VENIPUNCTURE: CPT | Performed by: INTERNAL MEDICINE

## 2018-02-18 PROCEDURE — 94640 AIRWAY INHALATION TREATMENT: CPT | Mod: 76

## 2018-02-18 PROCEDURE — 25000125 ZZHC RX 250: Performed by: HOSPITALIST

## 2018-02-18 PROCEDURE — 94640 AIRWAY INHALATION TREATMENT: CPT

## 2018-02-18 PROCEDURE — 87040 BLOOD CULTURE FOR BACTERIA: CPT | Performed by: INTERNAL MEDICINE

## 2018-02-18 PROCEDURE — 25000128 H RX IP 250 OP 636: Performed by: HOSPITALIST

## 2018-02-18 PROCEDURE — 83605 ASSAY OF LACTIC ACID: CPT | Performed by: HOSPITALIST

## 2018-02-18 PROCEDURE — 27210995 ZZH RX 272: Performed by: HOSPITALIST

## 2018-02-18 PROCEDURE — 99233 SBSQ HOSP IP/OBS HIGH 50: CPT | Performed by: HOSPITALIST

## 2018-02-18 RX ORDER — METOPROLOL TARTRATE 50 MG
50 TABLET ORAL 2 TIMES DAILY
Status: DISCONTINUED | OUTPATIENT
Start: 2018-02-18 | End: 2018-02-21 | Stop reason: HOSPADM

## 2018-02-18 RX ORDER — FUROSEMIDE 10 MG/ML
20 INJECTION INTRAMUSCULAR; INTRAVENOUS
Status: DISCONTINUED | OUTPATIENT
Start: 2018-02-18 | End: 2018-02-18

## 2018-02-18 RX ADMIN — METOPROLOL TARTRATE 50 MG: 50 TABLET ORAL at 20:09

## 2018-02-18 RX ADMIN — IPRATROPIUM BROMIDE AND ALBUTEROL SULFATE 3 ML: .5; 3 SOLUTION RESPIRATORY (INHALATION) at 07:03

## 2018-02-18 RX ADMIN — ACETAMINOPHEN 650 MG: 325 TABLET, FILM COATED ORAL at 23:04

## 2018-02-18 RX ADMIN — CEFTRIAXONE 2 G: 10 INJECTION, POWDER, FOR SOLUTION INTRAVENOUS at 21:42

## 2018-02-18 RX ADMIN — ASPIRIN 81 MG 81 MG: 81 TABLET ORAL at 08:09

## 2018-02-18 RX ADMIN — GUAIFENESIN AND DEXTROMETHORPHAN 10 ML: 100; 10 SYRUP ORAL at 00:04

## 2018-02-18 RX ADMIN — ACETAMINOPHEN 650 MG: 325 TABLET, FILM COATED ORAL at 17:15

## 2018-02-18 RX ADMIN — ENOXAPARIN SODIUM 40 MG: 40 INJECTION SUBCUTANEOUS at 20:09

## 2018-02-18 RX ADMIN — METOPROLOL TARTRATE 50 MG: 50 TABLET ORAL at 09:46

## 2018-02-18 RX ADMIN — MULTIPLE VITAMINS W/ MINERALS TAB 1 TABLET: TAB at 08:09

## 2018-02-18 RX ADMIN — METOPROLOL TARTRATE 25 MG: 25 TABLET ORAL at 02:14

## 2018-02-18 RX ADMIN — IPRATROPIUM BROMIDE AND ALBUTEROL SULFATE 3 ML: .5; 3 SOLUTION RESPIRATORY (INHALATION) at 12:02

## 2018-02-18 RX ADMIN — FUROSEMIDE 20 MG: 10 INJECTION, SOLUTION INTRAVENOUS at 08:35

## 2018-02-18 RX ADMIN — GUAIFENESIN AND DEXTROMETHORPHAN 10 ML: 100; 10 SYRUP ORAL at 03:59

## 2018-02-18 RX ADMIN — OSELTAMIVIR PHOSPHATE 75 MG: 75 CAPSULE ORAL at 08:09

## 2018-02-18 RX ADMIN — OSELTAMIVIR PHOSPHATE 75 MG: 75 CAPSULE ORAL at 20:09

## 2018-02-18 RX ADMIN — METOPROLOL TARTRATE 2.5 MG: 5 INJECTION, SOLUTION INTRAVENOUS at 07:57

## 2018-02-18 RX ADMIN — IPRATROPIUM BROMIDE AND ALBUTEROL SULFATE 3 ML: .5; 3 SOLUTION RESPIRATORY (INHALATION) at 19:17

## 2018-02-18 RX ADMIN — AZITHROMYCIN MONOHYDRATE 250 MG: 500 INJECTION, POWDER, LYOPHILIZED, FOR SOLUTION INTRAVENOUS at 21:43

## 2018-02-18 RX ADMIN — GUAIFENESIN AND DEXTROMETHORPHAN 10 ML: 100; 10 SYRUP ORAL at 13:53

## 2018-02-18 NOTE — PROGRESS NOTES
Xcoverage: paged by RN because BC from 2/16- 1/2 bottles- positive for GP coccobacilli; continue Ceftriaxone for now, follow up cultures, repeat BC now.    Annette Serna MD

## 2018-02-18 NOTE — PROVIDER NOTIFICATION
MD Notification    Notified Person:  MD    Notified Persons Name: Dr. Serna    Notification Date/Time: 2/18/18 at 0554    Notification Interaction:  Paged Physician    Purpose of Notification: 1st positive blood culture from 2/16 w/ gram negative coccibacili     Orders Received: repeat blood cultures    Comments: Pt already receiving IV ceftriaxone.

## 2018-02-18 NOTE — PLAN OF CARE
Problem: Pneumonia (Adult)  Goal: Signs and Symptoms of Listed Potential Problems Will be Absent, Minimized or Managed (Pneumonia)  Signs and symptoms of listed potential problems will be absent, minimized or managed by discharge/transition of care (reference Pneumonia (Adult) CPG).   Outcome: No Change  VSS. AAOx3; can be forgetful. No c/o pain. Pt endorses productive cough. Pt tolerated 4 L NC overnight; coarse and wheezy this morning. Scheduled neb to be given. Droplet precautions maintained. Blood culture came back positive for gram negative coccibacili; MD notified. Continue w/ IV ceftriaxone and repeat blood cultures. Continue to monitor.

## 2018-02-18 NOTE — PLAN OF CARE
Problem: Patient Care Overview  Goal: Plan of Care/Patient Progress Review  Outcome: No Change  Monitor remains atrial fib with RVR. Pt. Denies pain. Pt. Is forgetful. Droplet precautions maintained. Continue antibiotics and nebs per orders. Continue to monitor.

## 2018-02-18 NOTE — PLAN OF CARE
Problem: Patient Care Overview  Goal: Plan of Care/Patient Progress Review  BP stable. TMax this shift 99.9. Tele Afib RVR 110s. Denies pain. C/o frequent cough. Continues on IV ABX, tamiflu. SaO2 94% on 4LPM NC. Continue to monitor.

## 2018-02-18 NOTE — PROGRESS NOTES
[facility]  Hospitalist Progress Note   02/18/2018          Assessment and Plan:       Rafael Josue is a 67 year old male with history of hyperlipidemia [not on medications] mild polycythemia admitted on 12/16/2018 with ongoing shortness of breath.       Acute hypoxic respiratory failure requiring supplemental oxygen from pneumonia.  Community acquired pneumonia at-risk for bacterial organisms.  Symptoms have been ongoing for 10 days now. Progressively worsening shortness of breath, worse on minimal ambulation. Associated cough with minimal white colored phlegm. Has been recently diagnosed with influenza on 2/12.  In the ED, on presentation oxygen saturation's of 85% on room air, improved to 93% with 3 lts, with coarse breath sounds. WBC count of 7.3 Lactic acid 1.3. Pro calcitonin 4.01.. 7.41, PCO2 42. Chest x-ray showed bilateral pulmonary opacities primarily in the left upper lung and right base.   Blood cultures were collected and he was given normal saline bolus, 2 g IV Rocephin, 500 mg of IV azithromycin and Tylenol.  Continue ceftriaxone 1 g Q 12, azithromycin 500 mg daily []  supplemental nasal oxygen to keep saturations about 90%.   Consider BiPAP if any decompensation.  Sputum cultures normal rachel  Intermittent duo neb nebulizers. [Given smoking history]  will consider CT imaging if continues to have shortness of breath or any decompensation  aggressive incentives spirometry.  Supportive care with Robitussin/lozenges/normal saline nasal drops.      New onset atrial fibrillation with rapid ventricular response-likely in the setting of pneumonia/respiratory decompensation.  Patient denies any chest pain or palpitations. Does not have a history of atrial fibrillation  sodium 135, Troponin less than 0.015.magnesium 2.6 TSH of 0.13, Free T4 1.50.  Elevated Pro BNP of 6210  Telemetry monitoring shows atrial fibrillation with rate control.  Echocardiogram shows normal left  ventricular ejection fraction.  Continue aspirin 81 mg daily. [Started on 2/16]  Has been on 25 mg metoprolol every eight hours, will switch to 50 mg metoprolol twice daily.  IV Lasix for diuresis. Reassess volume status a.m.  Strict input output monitoring, low-salt diet. Daily weights.  Continue telemetry monitoring  Will consider IV metoprolol/Cardizem drip if heartrate greater than 140 or patient complains of symptoms.   If any decompensation or continues to be in A fib with rate not controlled will consider cardiology assessment.    Influenza Upper respiratory infection   was diagnosed with influenza on 2/11/2018 at the [facility].  Has been on Tamiflu from 2/11. Last dose this evening  Will treat for total of seven days given superimposed pneumonia.  Droplet precautions.   Emphasized frequent handwashing and adequate oral hydration.  Supportive care with lozenges, nasal drops, Robitussin.    Gram-negative bacteremia  blood cultures from 2/16 in one out of two bottles positive for gram-negative coccobacilli;   continue Ceftriaxone for now  follow final blood culture results.   Repeat blood cultures sent.       Mild hyponatremia from poor oral intake-improved  mild rhabdomyolysis likely from acute illness/ flu  Patient states poor oral intake in the last few days.  Presented with sodium 131 and this morning 134.    adequate oral hydration.  Discontinue IV fluids given elevated Pro BNP and congestion     Supressed TSH likely from acute illness  TSH of 0.13, Free T4 slightly elevated at 1.50.  Recheck thyroid function tests in 4 to 6 weeks.      History of hyperlipidemia not on medications  has HDL of 20, LDL 30. Total cholesterol 66      Nicotine dependence  patient smokes have to one pack a day.  Declined nicotine patch.  Counseled on considering smoking cessation.     Prediabetes with hemoglobin A1c of 6.3.  Monitor blood sugars periodically    # Pain Assessment:   Current Pain Score 2/18/2018  2/18/2018 2/18/2018   Patient currently in pain? denies denies denies   Pain score (0-10) 0 0 0   Rafael s pain level was assessed and he currently denies pain.      Active Diet Order      Combination Diet Low Saturated Fat Na <2400mg Diet, No Caffeine Diet    DVT Prophylaxis: Enoxaparin (Lovenox) SQ  Code Status: Full Code      Disposition: Expected discharge in 2-3 pending clinical improvement     Patient, interdisciplinary team involved in care and agrees with plan.  Total time - Greater than 35 min. More than 50% of time spent in direct patient care, care coordination and formalizing plan of care.     Marvin Spicer MD        Interval History:      patient lying in bed, appears slightly confused this morning.  Continues to have shortness of breath and requiring oxygen.  Denies any  palpitations or chest pain       Physical Exam:        Physical Exam   Temp:  [97.9  F (36.6  C)-99.9  F (37.7  C)] 98.5  F (36.9  C)  Heart Rate:  [] 98  Resp:  [11-30] 24  BP: (112-128)/(63-93) 119/69  SpO2:  [90 %-98 %] 95 %    Intake/Output Summary (Last 24 hours) at 02/18/18 0813  Last data filed at 02/18/18 0600   Gross per 24 hour   Intake              930 ml   Output             1000 ml   Net              -70 ml     Admission Weight: 81.6 kg (180 lb)  Current Weight: 84.5 kg (186 lb 3.2 oz)    PHYSICAL EXAM  HEENT: PERRLA, no frontal or maxillary sinus tenderness  Neck:  no JVD appreciated  Lungs: bilateral coarse breath sounds, no wheezing. Fine crackles present   CV: irregular S1 and S2. No murmurs  Abdomen: Soft, non-tender, non-distended. BS active.  Extremities: trace peripheral edema  Neuro: Alert and oriented. CN II-XII intact. Moving all extremities equally.          Medications:          multivitamin, therapeutic with minerals  1 tablet Oral Daily     sodium chloride (PF)  3 mL Intracatheter Q8H     metoprolol tartrate  25 mg Oral Q8H     cefTRIAXone  2 g Intravenous Q24H     azithromycin  250 mg Intravenous  Q24H     ipratropium - albuterol 0.5 mg/2.5 mg/3 mL  3 mL Nebulization 4x Daily     aspirin  81 mg Oral Daily     enoxaparin  40 mg Subcutaneous Q24H     oseltamivir  75 mg Oral BID     sodium chloride (PF), nitroGLYcerin, guaiFENesin-dextromethorphan, sore throat lozenge, sodium chloride, lidocaine visc 2% & maalox/mylanta w/simethicone & diphenhydramine, naloxone, melatonin, potassium chloride, potassium chloride, potassium chloride, potassium chloride with lidocaine, potassium chloride, magnesium sulfate, acetaminophen, acetaminophen, HYDROcodone-acetaminophen, morphine, senna-docusate **OR** senna-docusate, polyethylene glycol, bisacodyl, ondansetron **OR** ondansetron, metoprolol         Data:      All new lab and imaging data was reviewed.   EKG: atrial fibrillation, heart rate 117,      Telemetry:   atrial fibrillation with rate control.       Echo The left ventricle is normal in structure, function and size. The visual  ejection fraction is estimated at 55%.  2. The right ventricle is normal in structure, function and size.  3. The left atrium is moderately dilated.  4. No valve disease.  5. The ascending aorta is Mildly dilated (sinus of Valsalva 4.1cm, ascending  aorta 3.8cm).      Results for orders placed or performed during the hospital encounter of 18 (from the past 24 hour(s))   Echocardiogram    Narrative    [id number]  [facility]19  [id number]  [id number]^FABIANO^MIKAELA^           [facility]  Echocardiography Laboratory  [address] [place] [address]        Name: MERVIN LEO  MRN: [id number]  : 1950  Study Date: 2018 04:24 PM  Age: 67 yrs  Gender: Male  Patient Location: [facility]  Reason For Study: Afib  Ordering Physician: MIKAELA MARIO  Referring Physician: Marty Villegas  Performed By: Maryam Brooks RDCS     BSA: 2.0 m2  Height: 69 in  Weight: 184 lb  HR: 117  BP: 117/80  mmHg  _____________________________________________________________________________  __        Procedure  Complete Portable Echo Adult. Contrast Lumason.  _____________________________________________________________________________  __        Interpretation Summary     1. The left ventricle is normal in structure, function and size. The visual  ejection fraction is estimated at 55%.  2. The right ventricle is normal in structure, function and size.  3. The left atrium is moderately dilated.  4. No valve disease.  5. The ascending aorta is Mildly dilated (sinus of Valsalva 4.1cm, ascending  aorta 3.8cm).     No previous echo for comparison.  _____________________________________________________________________________  __        Left Ventricle  The left ventricle is normal in structure, function and size. There is normal  left ventricular wall thickness. The visual ejection fraction is estimated at  55%. Diastolic function not assessed due to atrial fibrillation. Normal left  ventricular wall motion.     Right Ventricle  The right ventricle is normal in structure, function and size.     Atria  The left atrium is moderately dilated. Right atrial size is normal. There is  no atrial shunt seen.     Mitral Valve  The mitral valve is normal in structure and function.        Tricuspid Valve  The tricuspid valve is normal in structure and function. No tricuspid  regurgitation.     Aortic Valve  The aortic valve is normal in structure and function.     Pulmonic Valve  The pulmonic valve is normal in structure and function.     Vessels  The ascending aorta is Mildly dilated. The IVC is normal in size and  reactivity with respiration, suggesting normal central venous pressure.     Pericardium  There is no pericardial effusion.        Rhythm  The rhythm was rapid atrial fibrillation.  _____________________________________________________________________________  __  MMode/2D Measurements & Calculations  IVSd: 0.96 cm      LVIDd: 4.7 cm  LVIDs: 2.3 cm  LVPWd: 1.1 cm  FS: 50.6 %  EDV(Teich): 102.2 ml  ESV(Teich): 18.5 ml  LV mass(C)d: 171.3 grams  LV mass(C)dI: 85.9 grams/m2  Ao root diam: 4.0 cm  LA dimension: 3.2 cm  asc Aorta Diam: 3.8 cm  LA/Ao: 0.79  LVOT diam: 2.2 cm  LVOT area: 3.7 cm2  LA Volume (BP): 52.5 ml  RWT: 0.47                 _____________________________________________________________________________  __           Report approved by: Jessica Meng 02/17/2018 05:08 PM      Basic metabolic panel   Result Value Ref Range    Sodium 134 133 - 144 mmol/L    Potassium 4.1 3.4 - 5.3 mmol/L    Chloride 100 94 - 109 mmol/L    Carbon Dioxide 26 20 - 32 mmol/L    Anion Gap 8 3 - 14 mmol/L    Glucose 124 (H) 70 - 99 mg/dL    Urea Nitrogen 19 7 - 30 mg/dL    Creatinine 0.80 0.66 - 1.25 mg/dL    GFR Estimate >90 >60 mL/min/1.7m2    GFR Estimate If Black >90 >60 mL/min/1.7m2    Calcium 8.0 (L) 8.5 - 10.1 mg/dL

## 2018-02-19 ENCOUNTER — APPOINTMENT (OUTPATIENT)
Dept: CT IMAGING | Facility: CLINIC | Age: 68
End: 2018-02-19
Attending: HOSPITALIST
Payer: COMMERCIAL

## 2018-02-19 LAB
BACTERIA SPEC CULT: NORMAL
BASOPHILS # BLD AUTO: 0 10E9/L (ref 0–0.2)
BASOPHILS NFR BLD AUTO: 0 %
CK SERPL-CCNC: 533 U/L (ref 30–300)
CREAT SERPL-MCNC: 0.74 MG/DL (ref 0.66–1.25)
DIFFERENTIAL METHOD BLD: ABNORMAL
EOSINOPHIL # BLD AUTO: 0 10E9/L (ref 0–0.7)
EOSINOPHIL NFR BLD AUTO: 0 %
ERYTHROCYTE [DISTWIDTH] IN BLOOD BY AUTOMATED COUNT: 14.8 % (ref 10–15)
GFR SERPL CREATININE-BSD FRML MDRD: >90 ML/MIN/1.7M2
GLUCOSE BLDC GLUCOMTR-MCNC: 124 MG/DL (ref 70–99)
HCT VFR BLD AUTO: 33.7 % (ref 40–53)
HGB BLD-MCNC: 11.3 G/DL (ref 13.3–17.7)
INR PPP: 1.24 (ref 0.86–1.14)
LACTATE BLD-SCNC: 1.1 MMOL/L (ref 0.7–2)
LYMPHOCYTES # BLD AUTO: 2.3 10E9/L (ref 0.8–5.3)
LYMPHOCYTES NFR BLD AUTO: 13 %
MCH RBC QN AUTO: 32.4 PG (ref 26.5–33)
MCHC RBC AUTO-ENTMCNC: 33.5 G/DL (ref 31.5–36.5)
MCV RBC AUTO: 97 FL (ref 78–100)
MONOCYTES # BLD AUTO: 0.5 10E9/L (ref 0–1.3)
MONOCYTES NFR BLD AUTO: 3 %
NEUTROPHILS # BLD AUTO: 14.9 10E9/L (ref 1.6–8.3)
NEUTROPHILS NFR BLD AUTO: 84 %
PLATELET # BLD AUTO: 346 10E9/L (ref 150–450)
PLATELET # BLD AUTO: NORMAL 10E9/L (ref 150–450)
PLATELET # BLD EST: ABNORMAL 10*3/UL
POLYCHROMASIA BLD QL SMEAR: SLIGHT
RBC # BLD AUTO: 3.49 10E12/L (ref 4.4–5.9)
SPECIMEN SOURCE: NORMAL
TOXIC GRANULES BLD QL SMEAR: PRESENT
WBC # BLD AUTO: 17.7 10E9/L (ref 4–11)

## 2018-02-19 PROCEDURE — 82550 ASSAY OF CK (CPK): CPT | Performed by: HOSPITALIST

## 2018-02-19 PROCEDURE — 40000556 ZZH STATISTIC PERIPHERAL IV START W US GUIDANCE

## 2018-02-19 PROCEDURE — 94640 AIRWAY INHALATION TREATMENT: CPT | Mod: 76

## 2018-02-19 PROCEDURE — 83605 ASSAY OF LACTIC ACID: CPT | Performed by: HOSPITALIST

## 2018-02-19 PROCEDURE — 82565 ASSAY OF CREATININE: CPT | Performed by: HOSPITALIST

## 2018-02-19 PROCEDURE — 25000128 H RX IP 250 OP 636: Performed by: HOSPITALIST

## 2018-02-19 PROCEDURE — 27210995 ZZH RX 272: Performed by: HOSPITALIST

## 2018-02-19 PROCEDURE — 85610 PROTHROMBIN TIME: CPT | Performed by: HOSPITALIST

## 2018-02-19 PROCEDURE — 40000257 ZZH STATISTIC CONSULT NO CHARGE VASC ACCESS

## 2018-02-19 PROCEDURE — 25000125 ZZHC RX 250: Performed by: HOSPITALIST

## 2018-02-19 PROCEDURE — 25000132 ZZH RX MED GY IP 250 OP 250 PS 637: Performed by: HOSPITALIST

## 2018-02-19 PROCEDURE — 36415 COLL VENOUS BLD VENIPUNCTURE: CPT | Performed by: HOSPITALIST

## 2018-02-19 PROCEDURE — 00000146 ZZHCL STATISTIC GLUCOSE BY METER IP

## 2018-02-19 PROCEDURE — 71260 CT THORAX DX C+: CPT

## 2018-02-19 PROCEDURE — 40000886 ZZH STATISTIC STEP DOWN HRS DAY

## 2018-02-19 PROCEDURE — 94640 AIRWAY INHALATION TREATMENT: CPT

## 2018-02-19 PROCEDURE — 99222 1ST HOSP IP/OBS MODERATE 55: CPT | Performed by: INTERNAL MEDICINE

## 2018-02-19 PROCEDURE — 99233 SBSQ HOSP IP/OBS HIGH 50: CPT | Performed by: HOSPITALIST

## 2018-02-19 PROCEDURE — 25000132 ZZH RX MED GY IP 250 OP 250 PS 637

## 2018-02-19 PROCEDURE — 21000000 ZZH R&B IMCU HEART CARE

## 2018-02-19 PROCEDURE — 40000275 ZZH STATISTIC RCP TIME EA 10 MIN

## 2018-02-19 PROCEDURE — 85025 COMPLETE CBC W/AUTO DIFF WBC: CPT | Performed by: INTERNAL MEDICINE

## 2018-02-19 RX ORDER — IOPAMIDOL 755 MG/ML
69 INJECTION, SOLUTION INTRAVASCULAR ONCE
Status: COMPLETED | OUTPATIENT
Start: 2018-02-19 | End: 2018-02-19

## 2018-02-19 RX ORDER — FUROSEMIDE 10 MG/ML
20 INJECTION INTRAMUSCULAR; INTRAVENOUS ONCE
Status: COMPLETED | OUTPATIENT
Start: 2018-02-19 | End: 2018-02-19

## 2018-02-19 RX ORDER — WARFARIN SODIUM 7.5 MG/1
7.5 TABLET ORAL
Status: COMPLETED | OUTPATIENT
Start: 2018-02-19 | End: 2018-02-19

## 2018-02-19 RX ADMIN — ACETAMINOPHEN 650 MG: 325 TABLET, FILM COATED ORAL at 05:30

## 2018-02-19 RX ADMIN — IPRATROPIUM BROMIDE AND ALBUTEROL SULFATE 3 ML: .5; 3 SOLUTION RESPIRATORY (INHALATION) at 07:28

## 2018-02-19 RX ADMIN — IPRATROPIUM BROMIDE AND ALBUTEROL SULFATE 3 ML: .5; 3 SOLUTION RESPIRATORY (INHALATION) at 15:20

## 2018-02-19 RX ADMIN — MULTIPLE VITAMINS W/ MINERALS TAB 1 TABLET: TAB at 10:32

## 2018-02-19 RX ADMIN — CEFTRIAXONE 2 G: 10 INJECTION, POWDER, FOR SOLUTION INTRAVENOUS at 18:24

## 2018-02-19 RX ADMIN — SODIUM CHLORIDE 93 ML: 9 INJECTION, SOLUTION INTRAVENOUS at 16:34

## 2018-02-19 RX ADMIN — WARFARIN SODIUM 7.5 MG: 7.5 TABLET ORAL at 17:13

## 2018-02-19 RX ADMIN — IPRATROPIUM BROMIDE AND ALBUTEROL SULFATE 3 ML: .5; 3 SOLUTION RESPIRATORY (INHALATION) at 19:35

## 2018-02-19 RX ADMIN — FUROSEMIDE 20 MG: 10 INJECTION, SOLUTION INTRAVENOUS at 18:24

## 2018-02-19 RX ADMIN — ASPIRIN 81 MG 81 MG: 81 TABLET ORAL at 10:32

## 2018-02-19 RX ADMIN — AZITHROMYCIN MONOHYDRATE 250 MG: 500 INJECTION, POWDER, LYOPHILIZED, FOR SOLUTION INTRAVENOUS at 17:13

## 2018-02-19 RX ADMIN — METOPROLOL TARTRATE 50 MG: 50 TABLET ORAL at 10:32

## 2018-02-19 RX ADMIN — IOPAMIDOL 69 ML: 755 INJECTION, SOLUTION INTRAVENOUS at 16:34

## 2018-02-19 RX ADMIN — METOPROLOL TARTRATE 50 MG: 50 TABLET ORAL at 22:17

## 2018-02-19 RX ADMIN — IPRATROPIUM BROMIDE AND ALBUTEROL SULFATE 3 ML: .5; 3 SOLUTION RESPIRATORY (INHALATION) at 12:33

## 2018-02-19 RX ADMIN — ENOXAPARIN SODIUM 40 MG: 40 INJECTION SUBCUTANEOUS at 18:24

## 2018-02-19 NOTE — CONSULTS
[id number]    Discussed with hospitalist service  Rates are reasonable for clinical scenario even before metoprolol dosing this AM (not yet given)  Could continue same vs change to cardizem if dictated by pulmonary status  Echo reassuring  Considering CT chest with elevation of WBC despite tx- could eval baseline of Ao dimensions and f/u in 6 months and 1 year (for follow-ups over time, MRA would be preferred given lack of radiation)  Recommend anticoagulation given CHADS Vasc 2 risk score and likely 3 (vascular disease- may assess by CTA also). Discussed both warfarin and NOACs with patient as well as risk of bleed with both, and cost/absence of readily-available reversal agent with NOACs. The patient verbalized understanding and agrees  Would do event recorder as outpatient if the patient reverts to NSR to ensure no further asymptomatic a fib prior to consideration of discontinuation of antiocagulation. I have a high suspicion for paroxysms otherwise in setting of overweight and moderate LAE on echo.  Recommend sleep study vs overnight oximetry once not on oxygen/stable (likely post-hospitalization)    Will sign off, please call with questions.

## 2018-02-19 NOTE — PROGRESS NOTES
[facility]  Hospitalist Progress Note   02/19/2018          Assessment and Plan:       Rafael Josue is a 67 year old male with history of hyperlipidemia [not on medications] mild polycythemia admitted on 12/16/2018 with ongoing shortness of breath.       Acute hypoxic respiratory failure requiring supplemental oxygen from pneumonia.  Community acquired pneumonia at-risk for bacterial organisms.  Symptoms have been ongoing for 10 days now. Progressively worsening shortness of breath, worse on minimal ambulation. Associated cough with minimal white colored phlegm. Has been recently diagnosed with influenza on 2/12.  In the ED, on presentation oxygen saturation's of 85% on room air, improved to 93% with 3 lts, with coarse breath sounds. WBC count of 7.3 Lactic acid 1.3. Pro calcitonin 4.01.. 7.41, PCO2 42. Chest x-ray showed bilateral pulmonary opacities primarily in the left upper lung and right base.   Blood cultures were collected and he was given normal saline bolus, 2 g IV Rocephin, 500 mg of IV azithromycin and Tylenol.  Continue ceftriaxone 1 g Q 12, azithromycin 500 mg daily []  supplemental nasal oxygen to keep saturations about 90%.   Consider BiPAP if any decompensation.  Sputum cultures normal rachel  Intermittent duo neb nebulizers. [Given smoking history]  CT of chest with contrast to rule out pulmonary embolism given patient's ongoing shortness of breath, tachycardia. And hypoxia  Aggressive incentives spirometry.  Supportive care with Robitussin/lozenges/normal saline nasal drops.      New onset atrial fibrillation with rapid ventricular response-likely in the setting of pneumonia/respiratory decompensation.  Patient denies any chest pain or palpitations. Does not have a history of atrial fibrillation  Sodium 135, Troponin less than 0.015.magnesium 2.6 TSH of 0.13, Free T4 1.50.  Elevated Pro BNP of 6210  Telemetry monitoring shows atrial fibrillation with rate  control.  Echocardiogram shows normal left ventricular ejection fraction.  Continue aspirin 81 mg daily. [Started on 2/16]  Has been on 25 mg metoprolol every eight hours(2/16),  switched to 50 mg metoprolol twice daily(2/18)  IV Lasix for diuresis. Reassess volume status a.m.  Strict input output monitoring, low-salt diet. Daily weights.  Continue telemetry monitoring  Will consider IV metoprolol/Cardizem drip if heartrate greater than 140 or patient complains of symptoms.   Patient continues to be in atrial fibrillation, heart rate is been fluctuating between hundred to 120 on rest. Patient has a CHADSVASC score of one for age. Will consult cardiology for further recommendations     Influenza Upper respiratory infection   was diagnosed with influenza on 2/11/2018 at the [facility].  Has been on Tamiflu from 2/11. Last dose was on 2/18 [has been treated for total of seven days]   discontinued droplet precautions.  Emphasized frequent handwashing and adequate oral hydration.  Supportive care with lozenges, nasal drops, Robitussin.     Gram-negative bacteremia  blood cultures from 2/16 in one out of two bottles positive for gram-negative coccobacilli-Enterobacter; repeat blood cultures from 2/18 show no growth in the right arm and left arm.  Has had fever Tmax of 101.5 yesterday.  continue Ceftriaxone for now  follow final blood culture results.       Mild hyponatremia from poor oral intake-improved  mild rhabdomyolysis likely from acute illness/ flu  Patient states poor oral intake in the last few days.  Presented with sodium 131 and this morning 134.    adequate oral hydration.  Discontinue IV fluids given elevated Pro BNP and congestion      Supressed TSH likely from acute illness  TSH of 0.13, Free T4 slightly elevated at 1.50.  Recheck thyroid function tests in 4 to 6 weeks.      History of hyperlipidemia not on medications  has HDL of 20, LDL 30. Total cholesterol 66      Nicotine dependence  patient  smokes have to one pack a day.  Declined nicotine patch.  Counseled on considering smoking cessation.      Prediabetes with hemoglobin A1c of 6.3.  Monitor blood sugars periodically    Concern for mild cognitive impairment  patient appears confused at times/forgetful  can consider cognitive assessment as outpatient once acute illness resolves    # Pain Assessment:   Current Pain Score 2/19/2018 2/19/2018 2/18/2018   Patient currently in pain? denies denies denies   Pain score (0-10) - - -   Rafael bedoya pain level was assessed and he currently denies pain.      Active Diet Order      Combination Diet Low Saturated Fat Na <2400mg Diet, No Caffeine Diet    DVT Prophylaxis:  Lovenox subcutaneous, pneumatic compression device.  Code Status: Full Code  Disposition: Expected discharge in 1 to 2 days pending clinical improvement    Patient, his wife, interdisciplinary team involved in care and agrees with plan.  Total time - Greater than 35 min. More than 50% of time spent in direct patient care, care coordination, patient/caregiver counseling, and formalizing plan of care.     Marvin Spicer MD        Interval History:      patient sitting up in the chair, appears more coherent today. Has been having ongoing shortness of breath requiring 3 to 4 L oxygen through nasal cannula. Denies any chest pain or palpitations. Has minimally ambulated out of bed to the commode.         Physical Exam:        Physical Exam   Temp:  [99.7  F (37.6  C)-101.5  F (38.6  C)] 100.3  F (37.9  C)  Heart Rate:  [] 112  Resp:  [11-28] 28  BP: (110-131)/(75-90) 131/85  SpO2:  [91 %-98 %] 98 %    Intake/Output Summary (Last 24 hours) at 02/19/18 0746  Last data filed at 02/19/18 0600   Gross per 24 hour   Intake              850 ml   Output             1370 ml   Net             -520 ml     Admission Weight: 81.6 kg (180 lb)  Current Weight: 84.5 kg (186 lb 3.2 oz)    PHYSICAL EXAM  HEENT: PERRLA, no frontal or maxillary sinus tenderness  Neck:   no JVD appreciated  Lungs: bilateral coarse breath sounds, no wheezing. Fine crackles present   CV: irregular S1 and S2. No murmurs  Abdomen: Soft, non-tender, non-distended. BS active.  Extremities: trace peripheral edema  Neuro: Alert and oriented. CN II-XII intact. Moving all extremities equally.        Medications:          metoprolol tartrate  50 mg Oral BID     multivitamin, therapeutic with minerals  1 tablet Oral Daily     sodium chloride (PF)  3 mL Intracatheter Q8H     cefTRIAXone  2 g Intravenous Q24H     azithromycin  250 mg Intravenous Q24H     ipratropium - albuterol 0.5 mg/2.5 mg/3 mL  3 mL Nebulization 4x Daily     aspirin  81 mg Oral Daily     enoxaparin  40 mg Subcutaneous Q24H     oseltamivir  75 mg Oral BID     sodium chloride (PF), nitroGLYcerin, guaiFENesin-dextromethorphan, sore throat lozenge, sodium chloride, lidocaine visc 2% & maalox/mylanta w/simethicone & diphenhydramine, naloxone, melatonin, potassium chloride, potassium chloride, potassium chloride, potassium chloride with lidocaine, potassium chloride, magnesium sulfate, acetaminophen, acetaminophen, HYDROcodone-acetaminophen, morphine, senna-docusate **OR** senna-docusate, polyethylene glycol, bisacodyl, ondansetron **OR** ondansetron, metoprolol         Data:      All new lab and imaging data was reviewed.   EKG: atrial fibrillation, heart rate 117,       Telemetry:   atrial fibrillation with rate control.        Echo The left ventricle is normal in structure, function and size. The visual  ejection fraction is estimated at 55%.  2. The right ventricle is normal in structure, function and size.  3. The left atrium is moderately dilated.  4. No valve disease.  5. The ascending aorta is Mildly dilated (sinus of Valsalva 4.1cm, ascending  aorta 3.8cm).

## 2018-02-19 NOTE — PHARMACY-ANTICOAGULATION SERVICE
Clinical Pharmacy - Warfarin Dosing Consult     Pharmacy has been consulted to manage this patient s warfarin therapy.  Indication: Atrial Fibrillation  Therapy Goal: INR 2-3  Warfarin Prior to Admission: No  Significant drug interactions: Azithromycin x 5 days and Enoxaparin     INR   Date Value Ref Range Status   02/19/2018 1.24 (H) 0.86 - 1.14 Final       Recommend warfarin 7.5 mg today.  Pharmacy will monitor Rafael Josue daily and order warfarin doses to achieve specified goal.      Please contact pharmacy as soon as possible if the warfarin needs to be held for a procedure or if the warfarin goals change.        Hiram BenavidesD

## 2018-02-19 NOTE — CONSULTS
Consult Date:  02/19/2018      CARDIOLOGY CONSULTATION      REQUESTING PHYSICIAN:  This is a new patient visit for Dr. Spicer.      REASON FOR CONSULTATION:  Atrial fibrillation.      HISTORY OF PRESENT ILLNESS:  Mr. Josue is a 67-year-old gentleman with no prior cardiac history who presented to the emergency room on the 16th of this month with complaints of 10 days of shortness of breath and associated cough productive of minimal white sputum.  He had subjective fevers and had been diagnosed with influenza in [facility] 5 days earlier, treated with Tamiflu.  He had no complaints of chest pain, lightheadedness, presyncope, syncope, PND, orthopnea, palpitations or pedal edema.  He cannot recall any palpitations that he has noted previously either.  He has no prior history of atrial fibrillation, but was found to be in atrial fibrillation with heart rates of approximately 110 beats per minute in the emergency room.  Oxygen saturations were 85% on room air, improved to 93% on 3 liters, and chest x-ray demonstrated bilateral pulmonary opacities primarily in the left upper lung and the right lung base.  He had a positive procalcitonin.  Troponins were drawn and negative.  Interestingly, TSH was suppressed at 0.13 with a free T4 of 1.5.  Blood cultures were drawn on presentation and came back positive for Enterobacter species.  The patient has been appropriately maintained on antibiotics.      Mr. Josue is as mentioned 67 years of age and does not have a known prior history of hypertension or diabetes, but is noted to have elevated fasting blood sugars during his stay here as well as an elevated hemoglobin A1c at 6.3.  His last glucose check was in 2017 which was within normal limits.  I do not see that the patient was given steroids during this hospitalization  on review of his orders.      Mr. Josue is on 81 mg of aspirin a day and DVT prophylaxis, Lovenox.  He is on metoprolol 50 mg twice daily.  An  echocardiogram was ordered and the results are now available with  normal left ventricular size, structure and function, and an ejection fraction of 55%, moderate left atrial dilatation and mild dilatation of the sinuses of Valsalva at 4.1 cm and ascending aorta at 3.8. There is no prior study for comparison.      Mr. Josue is entirely asymptomatic with his atrial fibrillation.      His heart rates are in the 90s to low 100s before his medications today.      PAST MEDICAL HISTORY:  Remarkable for former tobacco abuse, obesity, adenomatous polyposis of descending colon, carpal tunnel syndrome, mild polycythemia, dyslipidemia, untreated, crush injury to left elbow, inguinal hernia.      PAST SURGICAL HISTORY:  Includes ORIF left elbow.      SOCIAL HISTORY:  The patient describes occasional alcohol use and smokes half to 1 pack of cigarettes a day, ongoing. Smoked in the past, half to 1 pack per day.      FAMILY HISTORY:  Negative for premature coronary disease.      MEDICATIONS AS AN OUTPATIENT: Include none.      ALLERGIES:  No known drug allergies.      REVIEW OF SYSTEMS:  Per admitting H&P of Dr. Spicer dated 02/16/2018 including the patient's online medical record, otherwise as outlined above.      PHYSICAL EXAMINATION:   VITAL SIGNS:  Blood pressure is 116/83, heart rate was 109 and irregularly irregular, oxygen saturation 98% on 5 liters by nasal cannula.   GENERAL:  This is a well-appearing gentleman in no apparent distress.  He is alert and oriented x3.   HEENT:  Head and neck is unremarkable.  Pupils are equal, reactive to light and accommodation.  Extraocular motions are intact.  Mucous membranes are moist.  There is no jaundice or icterus.   CHEST:  With dense consolidation at the right base and scattered expiratory wheeze over the left lung fields.   CARDIAC:  S1, S2 are irregularly irregular without clear murmurs, rubs or gallops.  There is no jugular venous distention.  Heart sounds are distant.   Carotids are normal in upstroke, volume and contour bilaterally.   ABDOMEN:  Benign.  Bowel sounds are normal.   EXTREMITIES:  Without edema.   NEUROLOGIC:  Motor and sensory grossly intact.   MUSCULOSKELETAL:  The patient is moving all extremities equally.  There is no clubbing or cyanosis.     SKIN: There are no rashes or ecchymoses.      LABORATORY DATA:  Labs are reviewed with a creatinine of 0.74. CK of 533, decreased from 641. White count of 17.7, hemoglobin 11.3, and platelets 346,000.  Differential is pending.        EKG at presentation showed atrial flutter likely with rapid ventricular response. No clear ST-T abnormalities are described.      ASSESSMENT AND PLAN:  A pleasant 67-year-old gentleman with atrial fibrillation in the setting of pneumonia.  He has continued with fevers overnight to 100.8 and 100.3 earlier this morning.  In light of this, I rechecked a CBC with differential and his white count has increased quite dramatically.  I would ask the Hospitalist Service whether there might be any concern that his infection is not being treated by the antibiotics he has been given.  I do not see sensitivities yet on the blood cultures that came back positive.       I have discussed with the patient my recommendation for anticoagulation which could be either heparin in the hospital if there is any impending concern for bleed or need for invasive procedures or Coumadin versus a novel anticoagulant.  We have discussed the risks and benefits of both Coumadin and novel anticoagulants, including the need for INR monitoring and dietary modification with the Coumadin versus potential increased cost and lack of readily available reversal agent with a novel anticoagulant.  The patient verbalized understanding.        At this point, from the standpoint of his atrial fibrillation, I would recommend consideration of anticoagulation given a CHADS-VASc 2 risk score for age and likely diabetes with persistently elevated  blood sugars, but also an elevated hemoglobin A1c.  We do not know whether the patient has vascular disease.  He has no other known risk factors for stroke and atrial fibrillation.     The patient has not received his metoprolol this morning yet.  Heart rate is in the low 100s, which may well be appropriate in the setting of his pulmonary process.  If there is any concern for increased wheeze or worsen reactive airways in the setting of his pneumonia with metoprolol, certainly Cardizem could be used in its place.      From the standpoint of his thyroid tests, I would defer to the primary service whether it can be said that this is a sick euthyroid picture versus mild hyperthyroidism which certainly would also impact on the patient's risk for atrial fibrillation.      At this point, I would recommend a rate control strategy with anticoagulation and an event recorder at discharge.  Even should the patient return to normal sinus rhythm following his acute decompensation and other systemic stressors, I would be hesitant to discontinue the anticoagulants until at least a 30-day monitor has been done without evidence of recurrence given that he is asymptomatic.      I would also recommend an overnight oximetry at least, if not sleep study, once the patient is recovered and not requiring supplemental oxygen to surveil for obstructive sleep apnea given his body habitus.      The patient and his wife prefer to discuss any recommendations I have with the primary service rather than have me do any orders and I have told them that I would be more than happy to put my recommendations in this note.  Please feel free to call with any questions.  I will contact Dr. Spicer with these recommendations as well.      Of note also the patient's ascending aorta was 4.1 cm on echo.  We have no prior, but consideration can be given to MRA of the chest or CTA with yearly followup thereafter to monitor for progression.      Total time is 50  minutes, 45 in coordination of care and counseling.         AMMON RUELAS MD             D: 2018   T: 2018   MT: TAURUS      Name:     MERVIN LEO   MRN:      [id number]        Account:       [id number]   :      1950           Consult Date:  2018      Document: [id number]

## 2018-02-19 NOTE — PLAN OF CARE
Problem: Patient Care Overview  Goal: Plan of Care/Patient Progress Review  Outcome: No Change  BP stable. Pt forgetful and per wife has been somewhat confused which is why she brought him to hospital. Tele AFib -120. LS coarse. IV lasix d/c'd. TMax this shift 101.5. Gave prn tylenol and ice pack, temp down to 99.7. New IV placed, abx given. SaO2 92-97% on 4LPM NC. Pt up with SBA to bathroom. Continue to monitor.

## 2018-02-20 ENCOUNTER — APPOINTMENT (OUTPATIENT)
Dept: PHYSICAL THERAPY | Facility: CLINIC | Age: 68
End: 2018-02-20
Attending: HOSPITALIST
Payer: COMMERCIAL

## 2018-02-20 ENCOUNTER — APPOINTMENT (OUTPATIENT)
Dept: OCCUPATIONAL THERAPY | Facility: CLINIC | Age: 68
End: 2018-02-20
Attending: HOSPITALIST
Payer: COMMERCIAL

## 2018-02-20 LAB
ANION GAP SERPL CALCULATED.3IONS-SCNC: 5 MMOL/L (ref 3–14)
BUN SERPL-MCNC: 12 MG/DL (ref 7–30)
CALCIUM SERPL-MCNC: 8.1 MG/DL (ref 8.5–10.1)
CHLORIDE SERPL-SCNC: 101 MMOL/L (ref 94–109)
CO2 SERPL-SCNC: 29 MMOL/L (ref 20–32)
CREAT SERPL-MCNC: 0.74 MG/DL (ref 0.66–1.25)
GFR SERPL CREATININE-BSD FRML MDRD: >90 ML/MIN/1.7M2
GLUCOSE BLDC GLUCOMTR-MCNC: 128 MG/DL (ref 70–99)
GLUCOSE BLDC GLUCOMTR-MCNC: 141 MG/DL (ref 70–99)
GLUCOSE BLDC GLUCOMTR-MCNC: 149 MG/DL (ref 70–99)
GLUCOSE BLDC GLUCOMTR-MCNC: 238 MG/DL (ref 70–99)
GLUCOSE SERPL-MCNC: 125 MG/DL (ref 70–99)
INR PPP: 1.35 (ref 0.86–1.14)
LACTATE BLD-SCNC: 2.2 MMOL/L (ref 0.7–2)
MAGNESIUM SERPL-MCNC: 2.4 MG/DL (ref 1.6–2.3)
POTASSIUM SERPL-SCNC: 4.3 MMOL/L (ref 3.4–5.3)
PROCALCITONIN SERPL-MCNC: 0.95 NG/ML
SODIUM SERPL-SCNC: 135 MMOL/L (ref 133–144)
WBC # BLD AUTO: 20.5 10E9/L (ref 4–11)

## 2018-02-20 PROCEDURE — 97161 PT EVAL LOW COMPLEX 20 MIN: CPT | Mod: GP | Performed by: PHYSICAL THERAPIST

## 2018-02-20 PROCEDURE — 83735 ASSAY OF MAGNESIUM: CPT | Performed by: HOSPITALIST

## 2018-02-20 PROCEDURE — 94640 AIRWAY INHALATION TREATMENT: CPT

## 2018-02-20 PROCEDURE — 25000128 H RX IP 250 OP 636: Performed by: HOSPITALIST

## 2018-02-20 PROCEDURE — 36415 COLL VENOUS BLD VENIPUNCTURE: CPT | Performed by: HOSPITALIST

## 2018-02-20 PROCEDURE — 99233 SBSQ HOSP IP/OBS HIGH 50: CPT | Performed by: HOSPITALIST

## 2018-02-20 PROCEDURE — 40000275 ZZH STATISTIC RCP TIME EA 10 MIN

## 2018-02-20 PROCEDURE — 85610 PROTHROMBIN TIME: CPT | Performed by: HOSPITALIST

## 2018-02-20 PROCEDURE — 25000128 H RX IP 250 OP 636: Performed by: INTERNAL MEDICINE

## 2018-02-20 PROCEDURE — 97530 THERAPEUTIC ACTIVITIES: CPT | Mod: GO

## 2018-02-20 PROCEDURE — 97165 OT EVAL LOW COMPLEX 30 MIN: CPT | Mod: GO

## 2018-02-20 PROCEDURE — 85048 AUTOMATED LEUKOCYTE COUNT: CPT | Performed by: HOSPITALIST

## 2018-02-20 PROCEDURE — 83605 ASSAY OF LACTIC ACID: CPT | Performed by: INTERNAL MEDICINE

## 2018-02-20 PROCEDURE — 21000000 ZZH R&B IMCU HEART CARE

## 2018-02-20 PROCEDURE — 40000133 ZZH STATISTIC OT WARD VISIT

## 2018-02-20 PROCEDURE — 36415 COLL VENOUS BLD VENIPUNCTURE: CPT | Performed by: INTERNAL MEDICINE

## 2018-02-20 PROCEDURE — 00000146 ZZHCL STATISTIC GLUCOSE BY METER IP

## 2018-02-20 PROCEDURE — 97535 SELF CARE MNGMENT TRAINING: CPT | Mod: GO

## 2018-02-20 PROCEDURE — 99207 ZZC APP CREDIT; MD BILLING SHARED VISIT: CPT | Performed by: INTERNAL MEDICINE

## 2018-02-20 PROCEDURE — 25000125 ZZHC RX 250: Performed by: HOSPITALIST

## 2018-02-20 PROCEDURE — 25000132 ZZH RX MED GY IP 250 OP 250 PS 637: Performed by: HOSPITALIST

## 2018-02-20 PROCEDURE — 80048 BASIC METABOLIC PNL TOTAL CA: CPT | Performed by: HOSPITALIST

## 2018-02-20 PROCEDURE — 40000193 ZZH STATISTIC PT WARD VISIT: Performed by: PHYSICAL THERAPIST

## 2018-02-20 PROCEDURE — 27210995 ZZH RX 272: Performed by: HOSPITALIST

## 2018-02-20 PROCEDURE — 94640 AIRWAY INHALATION TREATMENT: CPT | Mod: 76

## 2018-02-20 PROCEDURE — 84145 PROCALCITONIN (PCT): CPT | Performed by: HOSPITALIST

## 2018-02-20 RX ORDER — DILTIAZEM HYDROCHLORIDE 30 MG/1
30 TABLET, FILM COATED ORAL EVERY 6 HOURS SCHEDULED
Status: DISCONTINUED | OUTPATIENT
Start: 2018-02-20 | End: 2018-02-21 | Stop reason: HOSPADM

## 2018-02-20 RX ORDER — WARFARIN SODIUM 7.5 MG/1
7.5 TABLET ORAL
Status: COMPLETED | OUTPATIENT
Start: 2018-02-20 | End: 2018-02-20

## 2018-02-20 RX ORDER — FUROSEMIDE 10 MG/ML
20 INJECTION INTRAMUSCULAR; INTRAVENOUS ONCE
Status: COMPLETED | OUTPATIENT
Start: 2018-02-20 | End: 2018-02-20

## 2018-02-20 RX ADMIN — IPRATROPIUM BROMIDE AND ALBUTEROL SULFATE 3 ML: .5; 3 SOLUTION RESPIRATORY (INHALATION) at 07:32

## 2018-02-20 RX ADMIN — IPRATROPIUM BROMIDE AND ALBUTEROL SULFATE 3 ML: .5; 3 SOLUTION RESPIRATORY (INHALATION) at 19:15

## 2018-02-20 RX ADMIN — METOPROLOL TARTRATE 50 MG: 50 TABLET ORAL at 08:56

## 2018-02-20 RX ADMIN — SODIUM CHLORIDE 500 ML: 9 INJECTION, SOLUTION INTRAVENOUS at 20:32

## 2018-02-20 RX ADMIN — FUROSEMIDE 20 MG: 10 INJECTION, SOLUTION INTRAVENOUS at 12:57

## 2018-02-20 RX ADMIN — DILTIAZEM HYDROCHLORIDE 30 MG: 30 TABLET, FILM COATED ORAL at 17:46

## 2018-02-20 RX ADMIN — IPRATROPIUM BROMIDE AND ALBUTEROL SULFATE 3 ML: .5; 3 SOLUTION RESPIRATORY (INHALATION) at 11:57

## 2018-02-20 RX ADMIN — MULTIPLE VITAMINS W/ MINERALS TAB 1 TABLET: TAB at 08:56

## 2018-02-20 RX ADMIN — WARFARIN SODIUM 7.5 MG: 7.5 TABLET ORAL at 17:46

## 2018-02-20 RX ADMIN — AZITHROMYCIN MONOHYDRATE 250 MG: 500 INJECTION, POWDER, LYOPHILIZED, FOR SOLUTION INTRAVENOUS at 17:46

## 2018-02-20 RX ADMIN — DILTIAZEM HYDROCHLORIDE 30 MG: 30 TABLET, FILM COATED ORAL at 12:56

## 2018-02-20 RX ADMIN — IPRATROPIUM BROMIDE AND ALBUTEROL SULFATE 3 ML: .5; 3 SOLUTION RESPIRATORY (INHALATION) at 15:44

## 2018-02-20 RX ADMIN — ENOXAPARIN SODIUM 40 MG: 40 INJECTION SUBCUTANEOUS at 20:06

## 2018-02-20 RX ADMIN — CEFTRIAXONE 2 G: 10 INJECTION, POWDER, FOR SOLUTION INTRAVENOUS at 20:06

## 2018-02-20 RX ADMIN — ASPIRIN 81 MG 81 MG: 81 TABLET ORAL at 08:56

## 2018-02-20 RX ADMIN — METOPROLOL TARTRATE 50 MG: 50 TABLET ORAL at 21:29

## 2018-02-20 ASSESSMENT — ACTIVITIES OF DAILY LIVING (ADL)
IADL_COMMENTS: PT HAS SUPPORT OF SPOUSE FOR IADLS
PREVIOUS_RESPONSIBILITIES: HOUSEKEEPING;LAUNDRY;SHOPPING;YARDWORK;MEDICATION MANAGEMENT;FINANCES;DRIVING;WORK

## 2018-02-20 NOTE — PROGRESS NOTES
[facility]  Hospitalist Progress Note   02/20/2018          Assessment and Plan:       Rafael Josue is a 67 year old male with history of hyperlipidemia [not on medications] mild polycythemia admitted on 12/16/2018 with ongoing shortness of breath.       Acute hypoxic respiratory failure requiring supplemental oxygen from pneumonia.  Community acquired pneumonia at-risk for bacterial organisms.  Symptoms have been ongoing for 10 days. Progressively worsening shortness of breath Associated cough with minimal white colored phlegm. In the ED, on presentation oxygen saturation's of 85% on room air, improved to 93% with 3 lts, with coarse breath sounds. WBC count of 7.3 Lactic acid 1.3. Pro calcitonin 4.01.. 7.41, PCO2 42. Chest x-ray showed bilateral pulmonary opacities primarily in the left upper lung and right base.   Blood cultures were collected and he was given normal saline bolus, 2 g IV Rocephin, 500 mg of IV azithromycin and Tylenol.  Sputum cultures normal rachel  CT of chest with contrast showed no pulmonary embolism. Moderate consolidation of the right lung base with mild diffuse infiltrates, 0.8 cm nodule in the lateral aspect of the left lower lobe of the lung.   Recommend follow-up imaging of chest after resolution of symptoms.   Aggressive incentives spirometry.  Supportive care with Robitussin/lozenges/normal saline nasal drops.  Continue ceftriaxone 1 g Q 12   continue azithromycin 500 mg daily [] total of five days.  Will de-escalate oral antibiotics at the time of discharge to complete a 10 day course of antibiotics.  Supplemental nasal oxygen to keep saturations about 90%.   Consider BiPAP if any decompensation.  Intermittent duo neb nebulizers. [Given smoking history]    New onset atrial fibrillation with rapid ventricular response-likely in the setting of pneumonia/respiratory decompensation.  Patient denies any chest pain or palpitations. Does not have a history of  atrial fibrillation  Sodium 135, Troponin less than 0.015.magnesium 2.6 TSH of 0.13, Free T4 1.50.  Elevated Pro BNP of 6210  Telemetry monitoring shows atrial fibrillation with rate control.  Echocardiogram shows normal left ventricular ejection fraction.  Continue aspirin 81 mg daily. [Started on 2/16]  Has been on 25 mg metoprolol every eight hours(2/16),  switched to 50 mg metoprolol twice daily(2/18)  will also start on Cardizem 30 mg Q6 hours if heartrate greater than 80. Given reactive airway disease would avoid the high dose beta-blocker at this time.  IV Lasix for diuresis. Reassess volume status a.m.  Strict input output monitoring, low-salt diet. Daily weights.  Continue telemetry monitoring  Will consider IV metoprolol/Cardizem drip if heartrate greater than 150 or patient complains of symptoms.  Has been evaluated by cardiology on 2/19. Recommended consideration of anticoagulation given a [id number]  Score of 2 (age and borderline blood sugars/ diabetes )  Started on Coumadin on 2/19. INR this morning 1.35.  Discussed risks and benefits of Coumadin therapy with patient, expressed understanding. Also recommend event recorder at the time of discharge and overnight pulse oximetry/sleep study once patient clinical status improves. Appreciate cardiology recommendations. Anticoagulation education during hospitalization.      Influenza Upper respiratory infection   was diagnosed with influenza on 2/11/2018 at the [facility] clinic.  Has been on Tamiflu from 2/11. Last dose was on 2/18 [has been treated for total of seven days]  Discontinued droplet precautions.  Emphasized frequent handwashing and adequate oral hydration.  Supportive care with lozenges, nasal drops, Robitussin.      Gram-negative bacteremia with Haemophilus influenza  blood cultures from 2/16 in one out of two bottles positive for Haemophilus influenza sensitive to ceftriaxone; repeat blood cultures from 2/18 show no growth in the right arm and  left arm.  continue Ceftriaxone during hospitalization and will change to oral fluoroquinolone or cephalosporin at the time of discharge.  follow final blood culture results.       Mild hyponatremia from poor oral intake-improved  mild rhabdomyolysis likely from acute illness/ flu  Patient states poor oral intake in the last few days.  Presented with sodium 131 and this morning 135.  >533  adequate oral hydration.  Discontinue IV fluids given elevated Pro BNP and congestion      Supressed TSH likely from acute illness  TSH of 0.13, Free T4 slightly elevated at 1.50.  Recheck thyroid function tests in 4 to 6 weeks.      History of hyperlipidemia not on medications  has HDL of 20, LDL 30. Total cholesterol 66      Nicotine dependence  patient smokes 1/2 to 1 pack a day.  Declined nicotine patch.  Counseled on considering smoking cessation.      Prediabetes with hemoglobin A1c of 6.3.  Monitor blood sugars periodically  diabetic education/diabetic educator consult during hospitalization.     Concern for mild cognitive impairment  patient appears confused at times/forgetful  can consider cognitive assessment as outpatient once acute illness resolves    Deconditioned multifactorial  patient appears deconditioned likely from acute illness/recent influenza.   Physical therapy and occupational therapy assessment    # Pain Assessment:   Current Pain Score 2/19/2018 2/19/2018 2/19/2018   Patient currently in pain? denies denies denies   Pain score (0-10) 0 - -   Rafael bedoya pain level was assessed and he currently denies pain.       Active Diet Order      Combination Diet Low Saturated Fat Na <2400mg Diet, No Caffeine Diet    DVT Prophylaxis:  Lovenox subcutaneous, pneumatic compression device.  Code Status: Full Code  Disposition: Expected discharge in 1 to 2 days pending clinical improvement     Patient, interdisciplinary team involved in care and agrees with plan.  Total time - Greater than 35 min. More than 50% of  time spent in direct patient care, care coordination and formalizing plan of care.     Marvin Spicer MD        Interval History:      patient admits to mild improvement in shortness of breath.   Sitting up in the chair.  Denies any chest pain or palpitations.  Heart rate have been fluctuating between 100 to 130.  Has minimally ambulated out of bed to commode and chair.         Physical Exam:        Physical Exam   Temp:  [98.2  F (36.8  C)-99.8  F (37.7  C)] 98.7  F (37.1  C)  Heart Rate:  [] 93  Resp:  [16-23] 20  BP: (108-119)/(71-83) 112/80  SpO2:  [94 %-95 %] 94 %    Intake/Output Summary (Last 24 hours) at 02/20/18 0749  Last data filed at 02/20/18 0701   Gross per 24 hour   Intake              250 ml   Output             2550 ml   Net            -2300 ml     Admission Weight: 81.6 kg (180 lb)  Current Weight: 84.5 kg (186 lb 3.2 oz)    PHYSICAL EXAM  HEENT: PERRLA, no frontal or maxillary sinus tenderness  Neck:  no JVD appreciated  Lungs: bilateral coarse breath sounds, no wheezing. Fine crackles present   CV: irregular S1 and S2. No murmurs  Abdomen: Soft, non-tender, non-distended. BS active.  Extremities: trace peripheral edema  Neuro: Alert and oriented. CN II-XII intact. Moving all extremities equally.          Medications:          metoprolol tartrate  50 mg Oral BID     multivitamin, therapeutic with minerals  1 tablet Oral Daily     sodium chloride (PF)  3 mL Intracatheter Q8H     cefTRIAXone  2 g Intravenous Q24H     azithromycin  250 mg Intravenous Q24H     ipratropium - albuterol 0.5 mg/2.5 mg/3 mL  3 mL Nebulization 4x Daily     aspirin  81 mg Oral Daily     enoxaparin  40 mg Subcutaneous Q24H     Warfarin Therapy Reminder, sodium chloride (PF), nitroGLYcerin, guaiFENesin-dextromethorphan, sore throat lozenge, sodium chloride, lidocaine visc 2% & maalox/mylanta w/simethicone & diphenhydramine, naloxone, melatonin, potassium chloride, potassium chloride, potassium chloride, potassium  chloride with lidocaine, potassium chloride, magnesium sulfate, acetaminophen, acetaminophen, HYDROcodone-acetaminophen, morphine, senna-docusate **OR** senna-docusate, polyethylene glycol, bisacodyl, ondansetron **OR** ondansetron, metoprolol         Data:      All new lab and imaging data was reviewed.   EKG: atrial fibrillation, heart rate 117,       Telemetry:   atrial fibrillation with heart rate between hundred to 130      Echo The left ventricle is normal in structure, function and size. The visual  ejection fraction is estimated at 55%.  2. The right ventricle is normal in structure, function and size.  3. The left atrium is moderately dilated.  4. No valve disease.  5. The ascending aorta is Mildly dilated (sinus of Valsalva 4.1cm, ascending  aorta 3.8cm).        Results for orders placed or performed during the hospital encounter of 02/16/18 (from the past 24 hour(s))   Glucose by meter   Result Value Ref Range    Glucose 124 (H) 70 - 99 mg/dL   INR   Result Value Ref Range    INR 1.24 (H) 0.86 - 1.14   Lactic acid level STAT   Result Value Ref Range    Lactic Acid 1.1 0.7 - 2.0 mmol/L   Glucose by meter   Result Value Ref Range    Glucose 141 (H) 70 - 99 mg/dL   CT Chest Pulmonary Embolism w Contrast    Narrative    CT CHEST WITH CONTRAST  2/19/2018 4:48 PM    HISTORY: Shortness of breath. Evaluate for pulmonary embolism.    COMPARISON: Radiographs on 2/16/2018.    TECHNIQUE: Routine transverse CT imaging of the chest was performed  following the uneventful intravenous administration of 69 mL Isovue  -370 contrast. A pulmonary embolism protocol was utilized. Radiation  dose for this scan was reduced using automated exposure control,  adjustment of the mA and/or kV according to patient size, or iterative  reconstruction technique.    FINDINGS: The heart size is normal. There is a mildly enlarged lymph  node in the aortopulmonary window with a short axis dimension of 1.4  cm. There are a few  nonenlarged mediastinal lymph nodes. No other  abnormal mediastinal mass is demonstrated. The thoracic aorta is  unremarkable. There is very good opacification of the pulmonary  arteries with contrast. No pulmonary embolism is seen. There is mild  to moderate consolidation of the right lung base with mild scattered  infiltrates elsewhere diffusely throughout both lungs. A distinct  focal mass is not seen although focal nodularity would be difficult to  exclude due to the diffuse infiltrates. This includes a 0.8 cm area of  nodular-type density in the lateral aspect of the left lower lobe on  series 4 image 118. No pneumothorax is demonstrated. No pleural  effusion is identified. There are degenerative changes in the spine.  No other osseous abnormality is seen. No chest wall pathology is seen.  The visualized upper abdomen is unremarkable.      Impression    IMPRESSION:   1. No pulmonary embolism is seen.  2. Moderate consolidation of the right lung base with mild diffuse  infiltrates elsewhere throughout both lungs.  3. There is a 0.8 cm nodular area in the lateral aspect of the left  lower lobe of the lung. I am uncertain if this represents a true  nodule versus a more localized infiltrate. A follow-up CT could be  considered following resolution of the patient's symptoms/infiltrates.  3. There is a single mildly enlarged mediastinal lymph node. This is  likely reactive in nature due to the adjacent infiltrates.    TRUONG RODRIGUEZ MD   INR   Result Value Ref Range    INR 1.35 (H) 0.86 - 1.14   Basic metabolic panel   Result Value Ref Range    Sodium 135 133 - 144 mmol/L    Potassium 4.3 3.4 - 5.3 mmol/L    Chloride 101 94 - 109 mmol/L    Carbon Dioxide 29 20 - 32 mmol/L    Anion Gap 5 3 - 14 mmol/L    Glucose 125 (H) 70 - 99 mg/dL    Urea Nitrogen 12 7 - 30 mg/dL    Creatinine 0.74 0.66 - 1.25 mg/dL    GFR Estimate >90 >60 mL/min/1.7m2    GFR Estimate If Black >90 >60 mL/min/1.7m2    Calcium 8.1 (L) 8.5 - 10.1  mg/dL   WBC count   Result Value Ref Range    WBC 20.5 (H) 4.0 - 11.0 10e9/L   Procalcitonin   Result Value Ref Range    Procalcitonin 0.95 ng/ml

## 2018-02-20 NOTE — PROGRESS NOTES
02/20/18 1339   Quick Adds   Type of Visit Initial Occupational Therapy Evaluation   Living Environment   Lives With spouse   Living Arrangements apartment   Home Accessibility stairs to enter home   Number of Stairs to Enter Home 12   Stair Railings at Home inside, present on right side   Transportation Available car;family or friend will provide   Living Environment Comment Pt lives with spouse in apartment, full flight to enter, tub/shower, standard height toilet.    Self-Care   Dominant Hand left   Usual Activity Tolerance good   Current Activity Tolerance moderate   Regular Exercise no   Equipment Currently Used at Home none   Functional Level Prior   Ambulation 0-->independent   Transferring 0-->independent   Toileting 0-->independent   Bathing 0-->independent   Dressing 0-->independent   Eating 0-->independent   Communication 0-->understands/communicates without difficulty   Swallowing 0-->swallows foods/liquids without difficulty   Cognition 0 - no cognition issues reported   Fall history within last six months no   Which of the above functional risks had a recent onset or change? cognition   Prior Functional Level Comment Pt reports indep in all ADLS, IADLs and mobility tasks with no AD at baseline. Pt works as , day shift, full time   General Information   Onset of Illness/Injury or Date of Surgery - Date 02/16/18   Referring Physician Marvin Spicer MD   Patient/Family Goals Statement Pt's goal is to d/c home   Additional Occupational Profile Info/Pertinent History of Current Problem Per chart: Pt is a 67 year old male admitted with increasing SOB, dx with influenza, a-fib   Precautions/Limitations fall precautions;oxygen therapy device and L/min   Cognitive Status Examination   Orientation orientation to person, place and time   Level of Consciousness alert   Able to Follow Commands mild impairment   Personal Safety (Cognitive) mild impairment   Memory impaired   Visual Perception    Visual Perception Wears glasses   Sensory Examination   Sensory Comments Pt denies numbness/tingling in BUEs   Pain Assessment   Patient Currently in Pain No   Range of Motion (ROM)   ROM Comment WFL   Strength   Strength Comments WFL   Hand Strength   Hand Strength Comments WFL   Coordination   Upper Extremity Coordination No deficits were identified   Bed Mobility Skill: Sit to Supine   Level of [place]: Sit/Supine stand-by assist   Physical Assist/Nonphysical Assist: Sit/Supine 1 person assist   Bed Mobility Skill: Supine to Sit   Level of [place]: Supine/Sit stand-by assist   Physical Assist/Nonphysical Assist: Supine/Sit 1 person assist   Transfer Skill: Bed to Chair/Chair to Bed   Level of [place]: Bed to Chair contact guard   Physical Assist/Nonphysical Assist: Bed to Chair 1 person assist   Transfer Skill: Sit to Stand   Level of [place]: Sit/Stand stand-by assist   Physical Assist/Nonphysical Assist: Sit/Stand 1 person assist   Transfer Skill: Toilet Transfer   Level of [place]: Toilet contact guard   Physical Assist/Nonphysical Assist: Toilet 1 person assist   Balance   Balance Comments CGA/SBA for mobility   Upper Body Dressing   Level of [place]: Dress Upper Body stand-by assist   Physical Assist/Nonphysical Assist: Dress Upper Body 1 person assist   Lower Body Dressing   Level of [place]: Dress Lower Body stand-by assist   Physical Assist/Nonphysical Assist: Dress Lower Body 1 person assist   Toileting   Level of [place]: Toilet stand-by assist   Physical Assist/Nonphysical Assist: Toilet 1 person assist   Grooming   Level of [place]: Grooming stand-by assist   Physical Assist/Nonphysical Assist: Grooming 1 person assist   Instrumental Activities of Daily Living (IADL)   Previous Responsibilities housekeeping;laundry;shopping;yardwork;medication management;finances;driving;work   IADL Comments Pt has support of spouse for IADLs   Activities of Daily Living  "Analysis   Impairments Contributing to Impaired Activities of Daily Living cognition impaired;strength decreased   ADL Comments Pt presents to OT below baseline level of functioning in areas of ADLs   General Therapy Interventions   Planned Therapy Interventions ADL retraining;IADL retraining;cognition;strengthening;transfer training   Clinical Impression   Criteria for Skilled Therapeutic Interventions Met yes, treatment indicated   OT Diagnosis Impaired ADLs, IADLs, mobility   Influenced by the following impairments Decreased functional activity tolerance, SOB, impaired safety/cognition   Assessment of Occupational Performance 5 or more Performance Deficits   Identified Performance Deficits Bathing, dressing, grooming, toileting, transfers, work   Clinical Decision Making (Complexity) Low complexity   Therapy Frequency daily   Predicted Duration of Therapy Intervention (days/wks) 3 days   Anticipated Discharge Disposition Home with Outpatient Therapy   Risks and Benefits of Treatment have been explained. Yes   Patient, Family & other staff in agreement with plan of care Yes   Clinical Impression Comments Pt would benefit from skilled OT to maximize safety and indep in all ADLs, IADLs and mobility tasks due to current deficits impacting function    [facility] AM-PAC  \"6 Clicks\" Daily Activity Inpatient Short Form   1. Putting on and taking off regular lower body clothing? 3 - A Little   2. Bathing (including washing, rinsing, drying)? 3 - A Little   3. Toileting, which includes using toilet, bedpan or urinal? 3 - A Little   4. Putting on and taking off regular upper body clothing? 3 - A Little   5. Taking care of personal grooming such as brushing teeth? 4 - None   6. Eating meals? 4 - None   Daily Activity Raw Score (Score out of 24.Lower scores equate to lower levels of function) 20   Total Evaluation Time   Total Evaluation Time (Minutes) 10     "

## 2018-02-20 NOTE — PLAN OF CARE
Problem: Patient Care Overview  Goal: Plan of Care/Patient Progress Review  Outcome: No Change  Alert and oriented x 3 but at times forgetful. Patient was put on comfort cares this morning. He continues to be on 60L high flow O2 and maintaining sats in the 80's-90's. Plan to remove oxygen on Wednesday when all family can be present.

## 2018-02-20 NOTE — PLAN OF CARE
Problem: Patient Care Overview  Goal: Plan of Care/Patient Progress Review  OT: Order received, eval completed and treatment initiated. Pt is a 67 year old male admitted with increasing SOB, dx with influenza, a-fib. Pt lives with spouse in house, stairs to enter/within, tub/shower, standard height toilet. Pt reports indep in all ADLs, IADLs and mobility tasks with no AD at baseline, works full-time as a .     Discharge Planner OT   Patient plan for discharge: Home  Current status: Pt completed transfers/mobility in room with CGA/SBA for safety, impulsive at times standing and setting off alarms, getting tangled in multiple lines with impaired awareness of safety risk. HR noted to be elevated into 150s with ambulation to/from toilet, ranging from 120s-140s sitting in chair.  Pt able to demo ADL tasks with SBA. Pt with noted confusion and slow responses to questions during session. [facility] cognitive screen completed with pt scoring 15/30 indicating significant cognitive deficit (21-26 = mild cognitive deficit; 27+ = normal). Pt with specific deficits in areas of STM/recall (2/5 words delayed, 2/3 with multiple choice, 0/1 with max cues; 3/4 story questions), math calculation, naming (11 animals in 1 minute), reverse/auditory memory and clock draw (numbers and time incorrect). Pt educated on results of screen, denies changes in cognition.   Barriers to return to prior living situation: None anticipated  Recommendations for discharge: Home w/ OP OT for further cognitive assessment  Rationale for recommendations: Pt would benefit from OP OT for more in-depth cognitive assessment based on clinical observations and cognitive screen, specifically due to pt's nature of work and independent PLOF.        Entered by: Ju Oden 02/20/2018 2:18 PM

## 2018-02-20 NOTE — PLAN OF CARE
Problem: Patient Care Overview  Goal: Plan of Care/Patient Progress Review  Outcome: No Change  Alert and oriented x 3 but disorganized thinking and forgetful. VS stable, on 2-3 L NC and mantaining sats >90%. Tele A fib with rates in the 's. Initiated Coumadin tonight. He was up walking in his room x3 with SBA.  Plan for further IV ABX and attempts to wean oxygen.

## 2018-02-20 NOTE — PLAN OF CARE
Problem: Patient Care Overview  Goal: Plan of Care/Patient Progress Review  Outcome: No Change  Droplet isolation for flu diagnosis, last tamiflu given 2/18. + PNA. IV abx. BC done. New afib diagnosis is possibly  secondary to infections. May do further outpatient workup.Positive blood culture for gram negative coccibaccili; continue w/ IV ceftriaxone and repeat blood cultures. Encourage ISP. Continue to monitor.

## 2018-02-20 NOTE — PLAN OF CARE
Problem: Patient Care Overview  Goal: Plan of Care/Patient Progress Review  Discharge Planner PT   Patient plan for discharge: home  Current status: pt admitted with increasing SOB, dx with influenza, previously I at apt with spouse with 12 steps to enter with rail, pt still working in security.  Pt able to complete all mob I on level and stairs with 1 rail, need for A for equipment and lines only.  NO further PT needs identified for acute care setting.  Rec nursing cont to amb with pt for A with equipment.  Will dc from PT  Barriers to return to prior living situation: none  Recommendations for discharge: home  Rationale for recommendations: pt deemed safety to return to home with spouse       Entered by: TONO FERRER 02/20/2018 11:55 AM      Physical Therapy Discharge Summary    Reason for therapy discharge:    No further expectations of functional progress.    Progress towards therapy goal(s). See goals on Care Plan in [facility] electronic health record for goal details.  Goals met    Therapy recommendation(s):    No further therapy is recommended.

## 2018-02-20 NOTE — PROGRESS NOTES
02/20/18 1100   Quick Adds   Type of Visit Initial PT Evaluation   Living Environment   Lives With spouse   Living Arrangements apartment   Home Accessibility stairs to enter home   Number of Stairs to Enter Home 12   Stair Railings at Home inside, present on right side   Transportation Available car;family or friend will provide   Self-Care   Regular Exercise no   Equipment Currently Used at Home none   Activity/Exercise/Self-Care Comment Pt still works as security   Functional Level Prior   Ambulation 0-->independent   Transferring 0-->independent   Toileting 0-->independent   Bathing 0-->independent   Dressing 0-->independent   Eating 0-->independent   Communication 0-->understands/communicates without difficulty   Swallowing 0-->swallows foods/liquids without difficulty   Cognition 0 - no cognition issues reported   Fall history within last six months no   Which of the above functional risks had a recent onset or change? none   General Information   Onset of Illness/Injury or Date of Surgery - Date 02/16/18   Referring Physician Tigist Spicer   Patient/Family Goals Statement pt plans to dc home   Pertinent History of Current Problem (include personal factors and/or comorbidities that impact the POC) admitted with increasing SOB   Precautions/Limitations oxygen therapy device and L/min  (on 3 lpm)   Weight-Bearing Status - LUE full weight-bearing   Weight-Bearing Status - RUE full weight-bearing   Weight-Bearing Status - LLE full weight-bearing   Weight-Bearing Status - RLE full weight-bearing   Cognitive Status Examination   Orientation orientation to person, place and time   Level of Consciousness alert   Follows Commands and Answers Questions 100% of the time   Personal Safety and Judgment intact   Memory intact   Pain Assessment   Patient Currently in Pain No   Posture    Posture Not impaired   Range of Motion (ROM)   ROM Quick Adds No deficits were identified   Strength   Manual Muscle Testing Quick  "Adds No deficits were identified   Transfer Skills   Transfer Comments I sit to stand with A /set up for lines and tubes   Gait   Gait Comments amb with no AD, SBA for equipment, I with gait on level and stairs with 1 rail x 15 steps   Coordination   Coordination no deficits were identified   Muscle Tone   Muscle Tone no deficits were identified   Muscle Tone Comments n   Clinical Impression   Criteria for Skilled Therapeutic Intervention no;evaluation only;no problems identified which require skilled intervention;current level of function same as previous level of function   Clinical Presentation Stable/Uncomplicated   Clinical Presentation Rationale clincal judgement   Clinical Decision Making (Complexity) Low complexity   Predicted Duration of Therapy Intervention (days/wks) eval only   Anticipated Discharge Disposition Home   Risk & Benefits of therapy have been explained Yes   Patient, Family & other staff in agreement with plan of care Yes   [facility] TM \"6 Clicks\"   2016, Trustees of [facility], under license to [employer].  All rights reserved.   6 Clicks Short Forms Basic Mobility Inpatient Short Form   [facility]  \"6 Clicks\" V.2 Basic Mobility Inpatient Short Form   1. Turning from your back to your side while in a flat bed without using bedrails? 4 - None   2. Moving from lying on your back to sitting on the side of a flat bed without using bedrails? 4 - None   3. Moving to and from a bed to a chair (including a wheelchair)? 4 - None   4. Standing up from a chair using your arms (e.g., wheelchair, or bedside chair)? 4 - None   5. To walk in hospital room? 4 - None   6. Climbing 3-5 steps with a railing? 4 - None   Basic Mobility Raw Score (Score out of 24.Lower scores equate to lower levels of function) 24   Total Evaluation Time   Total Evaluation Time (Minutes) 25     "

## 2018-02-20 NOTE — PROGRESS NOTES
Patient is on 2 LPM NC and SpO2 low to mid 90`s. Neb tx given as scheduled. BBS clear/diminished. Will continue to follow.

## 2018-02-21 ENCOUNTER — DOCUMENTATION ONLY (OUTPATIENT)
Dept: SLEEP MEDICINE | Facility: CLINIC | Age: 68
End: 2018-02-21

## 2018-02-21 ENCOUNTER — APPOINTMENT (OUTPATIENT)
Dept: OCCUPATIONAL THERAPY | Facility: CLINIC | Age: 68
End: 2018-02-21
Attending: HOSPITALIST
Payer: COMMERCIAL

## 2018-02-21 VITALS
DIASTOLIC BLOOD PRESSURE: 76 MMHG | TEMPERATURE: 98.3 F | OXYGEN SATURATION: 93 % | BODY MASS INDEX: 27.02 KG/M2 | WEIGHT: 182.4 LBS | SYSTOLIC BLOOD PRESSURE: 112 MMHG | RESPIRATION RATE: 22 BRPM | HEIGHT: 69 IN

## 2018-02-21 LAB
ANION GAP SERPL CALCULATED.3IONS-SCNC: 7 MMOL/L (ref 3–14)
BUN SERPL-MCNC: 14 MG/DL (ref 7–30)
CALCIUM SERPL-MCNC: 8 MG/DL (ref 8.5–10.1)
CHLORIDE SERPL-SCNC: 103 MMOL/L (ref 94–109)
CK SERPL-CCNC: 298 U/L (ref 30–300)
CO2 SERPL-SCNC: 27 MMOL/L (ref 20–32)
CREAT SERPL-MCNC: 0.75 MG/DL (ref 0.66–1.25)
GFR SERPL CREATININE-BSD FRML MDRD: >90 ML/MIN/1.7M2
GLUCOSE BLDC GLUCOMTR-MCNC: 121 MG/DL (ref 70–99)
GLUCOSE SERPL-MCNC: 132 MG/DL (ref 70–99)
INR PPP: 1.84 (ref 0.86–1.14)
LACTATE BLD-SCNC: 1.1 MMOL/L (ref 0.7–2)
MAGNESIUM SERPL-MCNC: 2.4 MG/DL (ref 1.6–2.3)
NT-PROBNP SERPL-MCNC: 1963 PG/ML (ref 0–900)
PHOSPHATE SERPL-MCNC: 3.4 MG/DL (ref 2.5–4.5)
POTASSIUM SERPL-SCNC: 4 MMOL/L (ref 3.4–5.3)
SODIUM SERPL-SCNC: 137 MMOL/L (ref 133–144)
WBC # BLD AUTO: 15.3 10E9/L (ref 4–11)

## 2018-02-21 PROCEDURE — 99207 ZZC NON-BILLABLE SERV PER CHARTING: CPT | Performed by: HOSPITALIST

## 2018-02-21 PROCEDURE — 94640 AIRWAY INHALATION TREATMENT: CPT | Mod: 76

## 2018-02-21 PROCEDURE — 93272 ECG/REVIEW INTERPRET ONLY: CPT | Performed by: INTERNAL MEDICINE

## 2018-02-21 PROCEDURE — 84100 ASSAY OF PHOSPHORUS: CPT | Performed by: HOSPITALIST

## 2018-02-21 PROCEDURE — 25000125 ZZHC RX 250: Performed by: HOSPITALIST

## 2018-02-21 PROCEDURE — 83880 ASSAY OF NATRIURETIC PEPTIDE: CPT | Performed by: HOSPITALIST

## 2018-02-21 PROCEDURE — 93270 REMOTE 30 DAY ECG REV/REPORT: CPT | Performed by: INTERNAL MEDICINE

## 2018-02-21 PROCEDURE — 40000275 ZZH STATISTIC RCP TIME EA 10 MIN

## 2018-02-21 PROCEDURE — 83735 ASSAY OF MAGNESIUM: CPT | Performed by: HOSPITALIST

## 2018-02-21 PROCEDURE — 97530 THERAPEUTIC ACTIVITIES: CPT | Mod: GO

## 2018-02-21 PROCEDURE — 36415 COLL VENOUS BLD VENIPUNCTURE: CPT | Performed by: INTERNAL MEDICINE

## 2018-02-21 PROCEDURE — 85610 PROTHROMBIN TIME: CPT | Performed by: HOSPITALIST

## 2018-02-21 PROCEDURE — 83605 ASSAY OF LACTIC ACID: CPT | Performed by: INTERNAL MEDICINE

## 2018-02-21 PROCEDURE — 40000133 ZZH STATISTIC OT WARD VISIT

## 2018-02-21 PROCEDURE — 94640 AIRWAY INHALATION TREATMENT: CPT

## 2018-02-21 PROCEDURE — 25000132 ZZH RX MED GY IP 250 OP 250 PS 637: Performed by: HOSPITALIST

## 2018-02-21 PROCEDURE — 99239 HOSP IP/OBS DSCHRG MGMT >30: CPT | Performed by: HOSPITALIST

## 2018-02-21 PROCEDURE — 80048 BASIC METABOLIC PNL TOTAL CA: CPT | Performed by: HOSPITALIST

## 2018-02-21 PROCEDURE — 85048 AUTOMATED LEUKOCYTE COUNT: CPT | Performed by: HOSPITALIST

## 2018-02-21 PROCEDURE — 82550 ASSAY OF CK (CPK): CPT | Performed by: HOSPITALIST

## 2018-02-21 PROCEDURE — 97535 SELF CARE MNGMENT TRAINING: CPT | Mod: GO

## 2018-02-21 PROCEDURE — 25000128 H RX IP 250 OP 636: Performed by: HOSPITALIST

## 2018-02-21 PROCEDURE — 00000146 ZZHCL STATISTIC GLUCOSE BY METER IP

## 2018-02-21 RX ORDER — WARFARIN SODIUM 7.5 MG/1
7.5 TABLET ORAL
Status: DISCONTINUED | OUTPATIENT
Start: 2018-02-21 | End: 2018-02-21 | Stop reason: HOSPADM

## 2018-02-21 RX ORDER — LEVOFLOXACIN 500 MG/1
500 TABLET, FILM COATED ORAL DAILY
Qty: 7 TABLET | Refills: 0 | Status: SHIPPED | OUTPATIENT
Start: 2018-02-21 | End: 2020-07-02

## 2018-02-21 RX ORDER — WARFARIN SODIUM 5 MG/1
7.5 TABLET ORAL DAILY
Qty: 5 TABLET | Refills: 0 | Status: SHIPPED | OUTPATIENT
Start: 2018-02-21 | End: 2020-07-02

## 2018-02-21 RX ORDER — FUROSEMIDE 10 MG/ML
20 INJECTION INTRAMUSCULAR; INTRAVENOUS ONCE
Status: COMPLETED | OUTPATIENT
Start: 2018-02-21 | End: 2018-02-21

## 2018-02-21 RX ORDER — DILTIAZEM HYDROCHLORIDE 120 MG/1
120 CAPSULE, COATED, EXTENDED RELEASE ORAL DAILY
Qty: 15 CAPSULE | Refills: 0 | Status: SHIPPED | OUTPATIENT
Start: 2018-02-21 | End: 2020-07-02

## 2018-02-21 RX ORDER — FUROSEMIDE 20 MG
20 TABLET ORAL DAILY
Qty: 7 TABLET | Refills: 0 | Status: SHIPPED | OUTPATIENT
Start: 2018-02-21 | End: 2020-07-02

## 2018-02-21 RX ORDER — METOPROLOL TARTRATE 50 MG
50 TABLET ORAL 2 TIMES DAILY
Qty: 30 TABLET | Refills: 0 | Status: SHIPPED | OUTPATIENT
Start: 2018-02-21 | End: 2020-07-02

## 2018-02-21 RX ADMIN — MULTIPLE VITAMINS W/ MINERALS TAB 1 TABLET: TAB at 08:34

## 2018-02-21 RX ADMIN — GUAIFENESIN AND DEXTROMETHORPHAN 10 ML: 100; 10 SYRUP ORAL at 00:13

## 2018-02-21 RX ADMIN — ASPIRIN 81 MG 81 MG: 81 TABLET ORAL at 08:34

## 2018-02-21 RX ADMIN — DILTIAZEM HYDROCHLORIDE 30 MG: 30 TABLET, FILM COATED ORAL at 00:09

## 2018-02-21 RX ADMIN — IPRATROPIUM BROMIDE AND ALBUTEROL SULFATE 3 ML: .5; 3 SOLUTION RESPIRATORY (INHALATION) at 11:37

## 2018-02-21 RX ADMIN — DILTIAZEM HYDROCHLORIDE 30 MG: 30 TABLET, FILM COATED ORAL at 11:46

## 2018-02-21 RX ADMIN — IPRATROPIUM BROMIDE AND ALBUTEROL SULFATE 3 ML: .5; 3 SOLUTION RESPIRATORY (INHALATION) at 14:21

## 2018-02-21 RX ADMIN — DILTIAZEM HYDROCHLORIDE 30 MG: 30 TABLET, FILM COATED ORAL at 05:00

## 2018-02-21 RX ADMIN — FUROSEMIDE 20 MG: 10 INJECTION, SOLUTION INTRAVENOUS at 09:24

## 2018-02-21 RX ADMIN — METOPROLOL TARTRATE 50 MG: 50 TABLET ORAL at 08:34

## 2018-02-21 RX ADMIN — IPRATROPIUM BROMIDE AND ALBUTEROL SULFATE 3 ML: .5; 3 SOLUTION RESPIRATORY (INHALATION) at 07:35

## 2018-02-21 RX ADMIN — GUAIFENESIN AND DEXTROMETHORPHAN 10 ML: 100; 10 SYRUP ORAL at 04:55

## 2018-02-21 NOTE — PLAN OF CARE
Problem: Patient Care Overview  Goal: Plan of Care/Patient Progress Review  Outcome: No Change  Vss except O2 sat in mid 80's while sleeping with mouth open.  O2 changed to 5L oxymask with increase In sats to mid 90's.  Lungs are diminished with fine crackles.  I A&O x3.  Impulsive and forgetful.  Remains in afib in 80's to 100's.  Denies pain.  Medicated for cough x 2.  Ambulated in rodríguez x 1 late in evening.  Up to chair several times during night.  Forgets to use call lite to ask for help.

## 2018-02-21 NOTE — DISCHARGE SUMMARY
Discharge Summary  Hospitalist    Date of Admission:  2/16/2018  Date of Discharge:  2/21/2018  Discharging Provider: Marvin Spicer MD    Primary Care Physician   [facility]  Primary Care Provider Phone Number: [phone]  Primary Care Provider Fax Number: [phone]    PRINCIPAL DIAGNOSIS  Acute hypoxic respiratory failure requiring supplemental oxygen from pneumonia.  Community acquired pneumonia at-risk for bacterial organisms.   New onset atrial fibrillation with rapid ventricular response-likely in the setting of pneumonia/respiratory decompensation.    Influenza Upper respiratory infection   Sepsis due to CAP- cleared  Gram-negative bacteremia with Haemophilus influenza - cleared    Mild hyponatremia from poor oral intake-improved  Mild rhabdomyolysis likely from acute illness/ flu   Supressed TSH likely from acute illness  Nicotine dependence  Incidental pulmonary nodule-left lung 0.8 cm  Concern for obstructive sleep apnea.  Prediabetes with hemoglobin A1c of 6.3.  Concern for mild cognitive impairment   Deconditioned multifactorial    History reviewed. No pertinent past medical history.    History of Present Illness   Rafael Josue is an 67 year old male who presented with shortness of breath    Hospital Course    Rafael Josue is a 67 year old male with history of hyperlipidemia [not on medications] mild polycythemia admitted on 12/16/2018 with ongoing shortness of breath.       Acute hypoxic respiratory failure requiring supplemental oxygen from pneumonia.  Community acquired pneumonia at-risk for bacterial organisms.  Sepsis due to CAP- cleared  Shortness of breath has been ongoing for 10 days, associated cough with white colored phlegm.  In the ED, on presentation oxygen saturation's of 85% on room air, improved to 93% with 3 lts, with coarse breath sounds. WBC count of 7.3 Lactic acid 1.3. Pro calcitonin 4.01.. 7.41, PCO2 42. Chest x-ray showed bilateral  pulmonary opacities primarily in the left upper lung and right base.   Blood cultures were collected and he was given normal saline bolus, 2 g IV Rocephin, 500 mg of IV azithromycin and Tylenol.  Sputum cultures normal rachel  CT of chest with contrast showed no pulmonary embolism. Moderate consolidation of the right lung base with mild diffuse infiltrates, 0.8 cm nodule in the lateral aspect of the left lower lobe of the lung.   Recommend follow-up imaging of chest after resolution of symptoms.   Aggressive incentives spirometry.  Supportive care with Robitussin/lozenges/normal saline nasal drops.  Has completed five day course of  azithromycin and six days of IV ceftriaxone. The escalated to oral Levaquin at the time of discharge to complete 14 days of antibiotics.  Has been saturating and low 80s off nasal oxygen. Prescribed supplemental nasal oxygen to keep saturations greater than 90%. Received intermittent duo nebulizers during hospitalization.     New onset atrial fibrillation with rapid ventricular response-likely in the setting of pneumonia/respiratory decompensation.  Patient denies any chest pain or palpitations. Does not have a history of atrial fibrillation and not been on aspirin at home  Sodium 135, Troponin less than 0.015.magnesium 2.6 TSH of 0.13, Free T4 1.50.  Elevated Pro BNP of 6210  Telemetry monitoring shows atrial fibrillation with rate control.  Echocardiogram shows normal left ventricular ejection fraction.  Started on aspirin 81 mg daily at the time of admission. And discontinued at time of discharge as patient and his wife reluctant to take multiple blood thinners.  Tarted on low-dose metoprolol and optimize 250 mg metoprolol twice daily.  Started on low-dose Cardizem and optimize to 120 mg Cardizem daily.  Has been needing IV Lasix for diuresis during hospitalization, discharge on oral Lasix. Optimize blood pressure medications including diuretics on PCP visit.  Telemetry monitoring  showed atrial fibrillation.  Heart rate was initially fluctuating between hundred to 120 but has dropped to 9200 on starting Cardizem yesterday.  Strict input output monitoring, low-salt diet. Daily weights.    Has been evaluated by cardiology on 2/19. Recommended consideration of anticoagulation given a [id number]  Score of 2 (age and borderline blood sugars/ diabetes )  Started on Coumadin on 2/19. INR this morning 1.35.  Discussed risks and benefits of Coumadin therapy with patient, expressed understanding. Also recommend event recorder at the time of discharge and overnight pulse oximetry/sleep study once patient clinical status improves.  Has received anticoagulation education during hospitalization.      Influenza Upper respiratory infection   was diagnosed with influenza on 2/11/2018 at the [facility].  Has been on Tamiflu from 2/11. Last dose was on 2/18 [has been treated for total of seven days]  Discontinued droplet precautions.  Emphasized frequent handwashing and adequate oral hydration.  Supportive care with lozenges, nasal drops, Robitussin.      Gram-negative bacteremia with Haemophilus influenza-cleared  blood cultures from 2/16 in one out of two bottles positive for Haemophilus influenza sensitive to ceftriaxone; repeat blood cultures from 2/18 show no growth in the right arm and left arm.  continue Ceftriaxone during hospitalization and change to oral levofloxacin to complete 14 day course of antibiotics.  Follow final blood culture results.       Mild hyponatremia from poor oral intake-improved  mild rhabdomyolysis likely from acute illness/ flu  Patient states poor oral intake in the last few days.  Presented with sodium 131 and this morning 135.  >533  adequate oral hydration.  Discontinued IV fluids given elevated Pro BNP and congestion      Supressed TSH likely from acute illness  TSH of 0.13, Free T4 slightly elevated at 1.50.  Recheck thyroid function tests in 4 to 6  weeks.      History of hyperlipidemia not on medications  has HDL of 20, LDL 30. Total cholesterol 66      Nicotine dependence  patient smokes 1/2 to 1 pack a day.  Declined nicotine patch.  Counseled on considering smoking cessation.      Prediabetes with hemoglobin A1c of 6.3.  Monitor blood sugars periodically  diabetic education/diabetic educator consult during hospitalization.      Concern for mild cognitive impairment  patient appears confused at times/forgetful  consider cognitive assessment as outpatient once acute illness resolves     Deconditioned multifactorial  patient appears deconditioned likely from acute illness/recent influenza.   Physical therapy recommended home with spouse.  Occupational therapy recommended outpatient cognitive assessment. The slums score during hospitalization waiting between 15 - 22 on 30.    # Discharge Pain Plan:   - Patient currently has NO PAIN and is not being prescribed pain medications on discharge.    At the time of discharge involved patient and his spouse. Have had multiple discussions during the day regarding discharge plan/transition of care. Patient and his spouse very apprehensive about multiple medications being started. State patient has never been on medications, not seen medical doctors prior to this hospitalization. Did discuss the indications for each and every medication. Also provided in note off for work until PCP assessment.   Patient, her spouse, interdisciplinary team involved in care and agree with discharge plan.    Marvin Spicer MD    Pending Results   the following results will be followed by hospitalist team  Unresulted Labs Ordered in the Past 30 Days of this Admission     Date and Time Order Name Status Description    2/18/2018 0601 Blood culture Preliminary     2/18/2018 0601 Blood culture Preliminary     2/16/2018 1600 Blood culture Preliminary     2/16/2018 1600 Blood culture Preliminary           Physical Exam   Vitals:    02/17/18 0500  02/18/18 0400 02/21/18 0500   Weight: 83.5 kg (184 lb 1.6 oz) 84.5 kg (186 lb 3.2 oz) 82.7 kg (182 lb 6.4 oz)     Vital Signs with Ranges  Temp:  [97.7  F (36.5  C)-99.3  F (37.4  C)] 98  F (36.7  C)  Heart Rate:  [] 112  Resp:  [19-30] 22  BP: ()/(47-94) 112/76  SpO2:  [87 %-98 %] 93 %  I/O last 3 completed shifts:  In: 2602 [P.O.:1840; I.V.:262; IV Piggyback:500]  Out: 2865 [Urine:2865]  PHYSICAL EXAM  HEENT: PERRLA, no frontal or maxillary sinus tenderness  Neck:  no JVD appreciated  Lungs: bilateral coarse breath sounds, no wheezing. Fine crackles present   CV: irregular S1 and S2. No murmurs  Abdomen: Soft, non-tender, non-distended. BS active.  Extremities: trace peripheral edema  Neuro: Alert and oriented. CN II-XII intact. Moving all extremities equally.     )Consultations This Hospital Stay   CARDIOLOGY IP CONSULT  PHARMACY TO DOSE WARFARIN  DIABETES EDUCATION IP CONSULT  PHYSICAL THERAPY ADULT IP CONSULT  OCCUPATIONAL THERAPY ADULT IP CONSULT    Time Spent on this Encounter   I, Marvin Spicer, personally saw the patient today and spent greater than 50 minutes discharging this patient.  More than 50% of time spent in direct patient care, care coordination, patient/caregiver counseling, and formalizing plan of care.    Discharge Orders     Follow-Up with Cardiac Advanced Practice Provider     Cardiac Event Monitor - Peds/Adult     Follow-up and recommended labs and tests    Follow up appointment is at 2/23 at 1:30pm with Dr. Hand  At [facility]  [address] [place] [address] [address]  [place] [address] [phone]    Please bring discharge paperwork from hospital.  You will have your INR drawn then     Follow-up and recommended labs and tests    Follow up with primary care provider, [facility], within 3 days for hospital follow- up.  The following labs/tests are recommended: INR / BMP.     Activity   Your activity upon discharge: activity as tolerated  and no driving until cognitive assessment as outpatient     Monitor and record   blood pressure/heart rate daily review on PCP visit. Optimize dose of metoprolol/Cardizem/Lasix on provider visit.     When to contact your care team   Call your primary doctor if you have any of the following:  increased shortness of breath any chest pain or palpitations.     Discharge Instructions   Follow-up for incidental pulmonary nodule per protocol.  Recommend follow-up imaging of chest after resolution of symptoms.  Aggressive incentives spirometry.  Supportive care with Robitussin syrup/lozenges.  On completer two weeks course of antibiotics.  Supplemental nasal oxygen to keep saturations about 90%. Wean as tolerated.  Low-salt low carb diet.  Discuss long-term anticoagulation on cardiology visit.  Event recorder at the time of discharge with cardiology follow-up.  Consider overnight pulse oximetry/sleep study once symptoms improve.   Consider lung function tests given patient's history of smoking.  closely monitor INR while on antibiotics.Goal INR between 2 to 3.  Emphasized on frequent handwashing and adequate oral hydration.  Recheck thyroid function in 4 to 6 weeks.  Strongly consider smoking cessation.  Monitor blood sugars /hemoglobin A1c periodically   outpatient cognitive assessment.  No driving until clearance from medical provider.   Note off for work for work until PCP visit provided.     Reason for your hospital stay   You were admitted with shortness of breath from influenza/pneumonias and had irregular heartbeat-atrial fibrillation.     Full Code     Oxygen Adult   [employer] Oxygen Order 2 liter(s) by nasal cannula continuously with use of portable tank. Expected treatment length is 2 weeks.. Test on conserving device as applicable.    Patients who qualify for home O2 coverage under the CMS guidelines require ABG tests or O2 sat readings obtained closest to, but no earlier than 2 days prior to the discharge, as  evidence of the need for home oxygen therapy. Testing must be performed while patient is in the chronic stable state. See notes for O2 sats.    I certify that this patient, Rafael Josue has been under my care and that I, or a nurse practitioner or physician's assistant working with me, had a face-to-face encounter that meets the face-to-face encounter requirements with this patient on 2/16/2018. Patient will have reassessment on PCP visit in few days for weaning off nasal oxygen. The patient, Rafael Josue was evaluated or treated in whole, or in part, for the following medical condition, which necessitates the use of the ordered oxygen    . Treatment Diagnosis: Acute hypoxic respiratory failure requiring supplemental oxygen from pneumonia.  Community acquired pneumonia at-risk for bacterial organisms.   New onset atrial fibrillation with rapid ventricular response-likely in the setting of pneumonia/respiratory decompensation.    Influenza Upper respiratory infection   nicotine dependence    Attending Provider: Marvin Spicer  Physician signature: See electronic signature associated with these discharge orders  Date of Order: February 21, 2018     Diet   Follow this diet upon discharge: Orders Placed This Encounter     Combination Diet Low Saturated Fat Na <2400mg Diet, No Caffeine Diet       Discharge Medications   Current Discharge Medication List      START taking these medications    Details   warfarin (COUMADIN) 5 MG tablet Take 1.5 tablets (7.5 mg) by mouth daily  Qty: 5 tablet, Refills: 0    Associated Diagnoses: Atrial fibrillation with rapid ventricular response (H)      metoprolol tartrate (LOPRESSOR) 50 MG tablet Take 1 tablet (50 mg) by mouth 2 times daily  Qty: 30 tablet, Refills: 0    Associated Diagnoses: Atrial fibrillation with rapid ventricular response (H)      levofloxacin (LEVAQUIN) 500 MG tablet Take 1 tablet (500 mg) by mouth daily  Qty: 7 tablet, Refills: 0    Associated Diagnoses:  Pneumonia of both lungs due to infectious organism, unspecified part of lung      furosemide (LASIX) 20 MG tablet Take 1 tablet (20 mg) by mouth daily Reassess on PCP visit. Hold if systolic BP < 100  Qty: 7 tablet, Refills: 0    Associated Diagnoses: Acute respiratory failure with hypoxia (H)      diltiazem (CARDIZEM CD) 120 MG 24 hr capsule Take 1 capsule (120 mg) by mouth daily Hold if systolic BP < 100 or HR < 50  Qty: 15 capsule, Refills: 0    Associated Diagnoses: Atrial fibrillation with rapid ventricular response (H)         CONTINUE these medications which have NOT CHANGED    Details   Multiple Vitamins-Minerals (MULTI FOR HIM) PACK Take 1 packet by mouth daily Patient takes a pack of vitamins a day. Includes Vitamin A,E,D,C & B Vitamins. Patient does not know exact amounts of vitamins.)           Allergies   No Known Allergies    Discharge Disposition   Discharged to home  Condition at discharge: Good    DATA    EKG: atrial fibrillation, heart rate 117,       Telemetry:   atrial fibrillation with heart rate between hundred to 130      Echo The left ventricle is normal in structure, function and size. The visual  ejection fraction is estimated at 55%.  2. The right ventricle is normal in structure, function and size.  3. The left atrium is moderately dilated.  4. No valve disease.  5. The ascending aorta is Mildly dilated (sinus of Valsalva 4.1cm, ascending  aorta 3.8cm).    Most Recent 3 CBC's:  Recent Labs   Lab Test  02/21/18   0520  02/20/18   0525  02/19/18   0500  02/16/18   1605   WBC  15.3*  20.5*  17.7*  7.3   HGB   --    --   11.3*  12.6*   MCV   --    --   97  94   PLT   --    --   346  Canceled, Test credited  196      Most Recent 3 BMP's:  Recent Labs   Lab Test  02/21/18   0520  02/20/18   0525  02/19/18   0500  02/18/18   0530   NA  137  135   --   134   POTASSIUM  4.0  4.3   --   4.1   CHLORIDE  103  101   --   100   CO2  27  29   --   26   BUN  14  12   --   19   CR  0.75  0.74   0.74  0.80   ANIONGAP  7  5   --   8   LISETH  8.0*  8.1*   --   8.0*   GLC  132*  125*   --   124*     Most Recent INR's and Anticoagulation Dosing History:  Anticoagulation Dose History     Recent Dosing and Labs Latest Ref Rng & Units 2/19/2018 2/20/2018 2/21/2018    Warfarin 7.5 mg - 7.5 mg 7.5 mg -    INR 0.86 - 1.14 1.24(H) 1.35(H) 1.84(H)        Most Recent 3 Troponin's:  Recent Labs   Lab Test  02/16/18   1605   TROPI  <0.015     Most Recent Cholesterol Panel:  Recent Labs   Lab Test  02/17/18   0650   CHOL  66   LDL  30   HDL  20*   TRIG  82     Most Recent 6 Bacteria Isolates From Any Culture (See EPIC Reports for Culture Details):  Recent Labs   Lab Test  02/18/18   0715  02/18/18   0710  02/16/18   2334  02/16/18   1753  02/16/18   1605   CULT  No growth after 3 days  No growth after 3 days  Light growth  Normal rachel    Cultured on the 2nd day of incubation:  Haemophilus influenzae nontypable  *  Critical Value/Significant Value, preliminary result only, called to and read back by  WILLIE WHITTINGTON RN @0551 2/18/18. SCG    (Note)      POSITIVE for ENTEROBACTER SPECIES by Verigene multiplex nucleic acid  test. Final identification and antimicrobial susceptibility testing  will be verified by standard methods.    Specimen tested with Verigene multiplex, gram-negative blood culture  nucleic acid test for the following targets: Acinetobacter sp.,  Citrobacter sp., Enterobacter sp., Proteus sp., E. coli, K.  pneumoniae/oxytoca, P. aeruginosa, and the following resistance  markers: CTXM, KPC, NDM, VIM, IMP and OXA.    Critical Value/Significant Value called to and read back by Beatris Aguilera R.N. on [facility] at 8:05am on 02.18.17 JT.      No growth after 5 days     Most Recent TSH, T4 and A1c Labs:  Recent Labs   Lab Test  02/17/18   0650  02/16/18   1605   TSH   --   0.13*   T4   --   1.50*   A1C  6.3*   --      Results for orders placed or performed during the hospital encounter of 02/16/18   XR Chest 2 Views     Narrative    CHEST TWO VIEWS   2/16/2018 4:30 PM     HISTORY: Dyspnea, diagnosed with Influenza six days ago.    COMPARISON: None.    FINDINGS: Mild cardiomegaly. Moderately extensive opacities in the  left upper lung and right mid lung and base. These suggest pneumonia,  but are nonspecific. I do not have a previous study for comparison.      Impression    IMPRESSION: Bilateral pulmonary opacities primarily left upper lung  and right base.    BEAU MILLS MD   CT Chest Pulmonary Embolism w Contrast    Narrative    CT CHEST WITH CONTRAST  2/19/2018 4:48 PM    HISTORY: Shortness of breath. Evaluate for pulmonary embolism.    COMPARISON: Radiographs on 2/16/2018.    TECHNIQUE: Routine transverse CT imaging of the chest was performed  following the uneventful intravenous administration of 69 mL Isovue  -370 contrast. A pulmonary embolism protocol was utilized. Radiation  dose for this scan was reduced using automated exposure control,  adjustment of the mA and/or kV according to patient size, or iterative  reconstruction technique.    FINDINGS: The heart size is normal. There is a mildly enlarged lymph  node in the aortopulmonary window with a short axis dimension of 1.4  cm. There are a few nonenlarged mediastinal lymph nodes. No other  abnormal mediastinal mass is demonstrated. The thoracic aorta is  unremarkable. There is very good opacification of the pulmonary  arteries with contrast. No pulmonary embolism is seen. There is mild  to moderate consolidation of the right lung base with mild scattered  infiltrates elsewhere diffusely throughout both lungs. A distinct  focal mass is not seen although focal nodularity would be difficult to  exclude due to the diffuse infiltrates. This includes a 0.8 cm area of  nodular-type density in the lateral aspect of the left lower lobe on  series 4 image 118. No pneumothorax is demonstrated. No pleural  effusion is identified. There are degenerative changes in the spine.  No  other osseous abnormality is seen. No chest wall pathology is seen.  The visualized upper abdomen is unremarkable.      Impression    IMPRESSION:   1. No pulmonary embolism is seen.  2. Moderate consolidation of the right lung base with mild diffuse  infiltrates elsewhere throughout both lungs.  3. There is a 0.8 cm nodular area in the lateral aspect of the left  lower lobe of the lung. I am uncertain if this represents a true  nodule versus a more localized infiltrate. A follow-up CT could be  considered following resolution of the patient's symptoms/infiltrates.  3. There is a single mildly enlarged mediastinal lymph node. This is  likely reactive in nature due to the adjacent infiltrates.    TRUONG RODRIGUEZ MD

## 2018-02-21 NOTE — PROVIDER NOTIFICATION
MD Notification    Notified Person:  MD    Notified Persons Name: Dr. Nevarez    Notification Date/Time: 2/20/18  7:00pm    Notification Interaction:  Talked with Physician    Purpose of Notification: Notified MD pt's lactic acid 2.2. RR 26, and WBC 20.5.    Orders Received: MD to assess pt.    Comments: Will continue to monitor pt.

## 2018-02-21 NOTE — PLAN OF CARE
Problem: Patient Care Overview  Goal: Plan of Care/Patient Progress Review  Outcome: No Change  Pt remains A&O x 3, however, he remains forgetful. HR ranged from 100-150's today. Pt started on diltiazem 30mg Q 6hrs. Tele now A-fib with -130's. Pt remains on 2 L's O2, unable to wean this evening as pt's sats dropped to 88%. Pt denies any chest pain or SOB. Ambulated floor x 1 this shift. Plan is for D/C home with PT/OT when medically stable. Chair alarm on. Will continue to monitor pt.

## 2018-02-21 NOTE — PROGRESS NOTES
Received intake call for home oxygen at 12:02PM. Reviewed patient's chart; and found that patient's diagnosis is acute and would not be covered under insurance guidelines.  12:38PM- Spoke with nurse, Katherine,  Who was at lunch and said that she would call me back.   1:32 PM- Called nursing station of patient and explained that the diagnosis is acute and needs to be changed to something chronic to be covered by insurance. Nurse said that she would leave message for Katherine to call me back.   1:37 PM- Katherine called back, and agreed that there was no good diagnosis for insurance, so the patient would need to pay OOP.  1:39PM- Called to offer choice and patient is okay with [facility] setting them up. Explained to patient that his insurance would not cover the O2 based on his diagnosis, and if he wants to get oxygen today would need to pay out of pocket. Pt agreed to pay OOP for one month rental. Discussed equipment with patient and informed them that we would be to bedside with oxygen in the next 1-1.5 hours.

## 2018-02-21 NOTE — PLAN OF CARE
"Problem: Patient Care Overview  Goal: Plan of Care/Patient Progress Review  Discharge Planner OT   Patient plan for discharge: Home.  Current status: SB A for ADL in room. 2 LOBs that patient was able to self correct. Patient upset about cognitive testing yesterday, asking to \"redo\" it. Gave patient the MoCA, scoring a 22 of 30 (>26 considered \"normal\"). Deficits include memory, abstract thought and executive function. Patient continues to deny any issues. States \"I know where I am\" with discussion about OTs concerns.  Barriers to return to prior living situation: Cognitive safety.   Recommendations for discharge: Home with wife's supervision with all IADL. Rec home RN and home OT if patient will be homebound, otherwise OP OT for cognition.   Rationale for recommendations: Patient demo's cognitive concern, would benefit from follow up on discharge.      Entered by: Rabia Bucio 02/21/2018 11:21 AM         "

## 2018-02-21 NOTE — PROGRESS NOTES
[facility]    Sepsis Evaluation Progress Note    Date of Service: 02/20/2018    I was called to see Rafael Josue due to abnormal vital signs triggering the Sepsis SIRS screening alert. He is known to have an infection (influenza plus pneumonia with Haemophilus influenza bacteremia, on Zithromax and Ceftriaxone).  Overall he is feeling better.  Had poor appetite, now improving.  Does have afib, heart rate was 140 earlier, is getting nebs, and current heart rate is 89 in afib, is at rest.      Physical Exam    Vital Signs:  Temp: 97.8  F (36.6  C) Temp src: Oral BP: (!) 111/94   Heart Rate: 104 Resp: 22 SpO2: 95 % O2 Device: Nasal cannula Oxygen Delivery: 2 LPM    Lab:  Lactic Acid   Date Value Ref Range Status   02/20/2018 2.2 (H) 0.7 - 2.0 mmol/L Final       The patient is at baseline mental status. (slightly off, had to be reminded he has influenza and pneumonia, but no change according to nurse).      The rest of their physical exam is significant for heart with slightly irregular S1S2, lungs fairly clear (no wheezing).  .    Assessment and Plan    The SIRS and exam findings are likely due to   Sepsis which is appropriately being treated, and patient clinically improving.     ID: The patient is currently on the following antibiotics:  Anti-infectives (Future)    Start     Dose/Rate Route Frequency Ordered Stop    02/17/18 1900  cefTRIAXone (ROCEPHIN) 2 g in 20 mL SWFI Premix Syringe      2 g  over 3 Minutes Intravenous EVERY 24 HOURS 02/16/18 1858          Current antibiotic coverage is appropriate for source of infection.    Fluid: Fluid bolus ordered.    Lab: Repeat lactic acid is not indicated at this time, but will check in AM to assure continuing to clinically improve.     Disposition: The patient will remain on the current unit. We will continue to monitor this patient closely.    Carlos Manuel Carver MD   Pager: [phone]  Cell Phone:  [phone]

## 2018-02-21 NOTE — PROGRESS NOTES
Patient has been assessed for Home Oxygen needs. Oxygen readings:    *Pulse oximetry (SpO2) = 87% on room air at rest while awake.    *SpO2 improved to 94% on 2liters/minute at rest.    *SpO2 = 85% on room air during activity/with exercise.    *SpO2 improved to 92% on 2liters/minute during activity/with exercise.

## 2018-02-21 NOTE — PROGRESS NOTES
Met with patient and family. They currently do not have home oxygen. They would like to use   [facility] for home oxygen. Patient will pay out of pocket for home oxygen, made aware his insurance will not cover. Faxed handoff to PCP.[phone]. Dr. Hand office

## 2018-02-21 NOTE — DISCHARGE INSTRUCTIONS
Oxygen Provider:  Arranged through [employer], contact number [phone].If you have any questions or concerns please call the oxygen company directly.

## 2018-02-21 NOTE — PROGRESS NOTES
SPIRITUAL HEALTH SERVICES Progress Note  [facility]    F/U visit.  SH previously visited pt per HCD request.  Today SH visited per LOS.  Pt denies need of SH support.  SH offered future availability per pt request.                                                                                                                                           Hiram Harris M.Div., [facility]  Staff   Pager [phone]

## 2018-02-22 ENCOUNTER — CARE COORDINATION (OUTPATIENT)
Dept: CARDIOLOGY | Facility: CLINIC | Age: 68
End: 2018-02-22

## 2018-02-22 LAB
BACTERIA SPEC CULT: NO GROWTH
GLUCOSE BLDC GLUCOMTR-MCNC: 118 MG/DL (ref 70–99)
Lab: NORMAL
SPECIMEN SOURCE: NORMAL

## 2018-02-22 NOTE — PROGRESS NOTES
Patient was evaluated by cardiology while inpatient for afib with RVR. Called patient to discuss any post hospital d/c questions he may have, review medication changes, and confirm f/u appts.Patient denied any questions regarding new medications or changes to some of his current medications that he was taking prior to admission. Patient denied any SOB, chest pain, or light headedness.  RN confirmed with patient that he is wearing his 30 day event monitor. RN confirmed with patient that he has an apt scheduled on 3/26/18 with [facility] . Patient advised to call clinic with any cardiac related questions or concerns prior to his apt on 3/26/18. Patient verbalized understanding and agreed with plan.

## 2018-02-22 NOTE — PROGRESS NOTES
VSS. Tele shows A-fib with CVR. Pt denies any CP or SOB. Discharge instructions, medication indications/side effects, and follow up instructions discussed w/ pt and spouse. Handouts given. Coumadin video watched. All questions answered. Medications filled and given to pt. All pt belongings are accounted for and sent home w/ pt. Pt left floor via w/c and was driven home by spouse.

## 2018-02-23 ENCOUNTER — DOCUMENTATION ONLY (OUTPATIENT)
Dept: CARDIOLOGY | Facility: CLINIC | Age: 68
End: 2018-02-23

## 2018-02-23 NOTE — PROGRESS NOTES
Reviewed [employer] report dated 2/21/18 showing afib/flutter, rates  bpm. Pt is on warfarin & metoprolol for the afib. Report to file. LPenfield RN

## 2018-02-24 LAB
BACTERIA SPEC CULT: ABNORMAL
BACTERIA SPEC CULT: NO GROWTH
BACTERIA SPEC CULT: NO GROWTH
Lab: ABNORMAL
Lab: NORMAL
Lab: NORMAL
SPECIMEN SOURCE: ABNORMAL
SPECIMEN SOURCE: NORMAL
SPECIMEN SOURCE: NORMAL

## 2018-02-26 NOTE — PROGRESS NOTES
Cardionet strip dated 2/22/18-2/24/18 reviewed. Afib/flutter. 90-100BPM.  Strip to file.  ARETAH Paniagua.

## 2018-03-01 NOTE — PROGRESS NOTES
Cardionet strip dated 2/26/18-2/27/18 reviewed. Afib/flutter. 90-130BPM.  Strip to file.  ARETHA Paniagua.

## 2018-03-27 NOTE — PROGRESS NOTES
Sallie end of service summary report received.  Placed in Dr. Aguilar' paper in-box for review and signature.  ARETHA Paniagua.

## 2020-07-02 ENCOUNTER — DOCUMENTATION ONLY (OUTPATIENT)
Dept: GERIATRICS | Facility: CLINIC | Age: 70
End: 2020-07-02
Payer: COMMERCIAL

## 2020-07-02 DIAGNOSIS — J44.9 CHRONIC OBSTRUCTIVE PULMONARY DISEASE, UNSPECIFIED COPD TYPE (H): ICD-10-CM

## 2020-07-02 DIAGNOSIS — U07.1 ACUTE RESPIRATORY DISTRESS SYNDROME (ARDS) DUE TO COVID-19 VIRUS (H): Primary | ICD-10-CM

## 2020-07-02 DIAGNOSIS — J80 ACUTE RESPIRATORY DISTRESS SYNDROME (ARDS) DUE TO COVID-19 VIRUS (H): Primary | ICD-10-CM

## 2020-07-02 DIAGNOSIS — G72.81 CRITICAL ILLNESS MYOPATHY: ICD-10-CM

## 2020-07-02 DIAGNOSIS — I25.10 CORONARY ARTERY DISEASE INVOLVING NATIVE CORONARY ARTERY OF NATIVE HEART WITHOUT ANGINA PECTORIS: ICD-10-CM

## 2020-07-02 DIAGNOSIS — J96.11 CHRONIC RESPIRATORY FAILURE WITH HYPOXIA (H): ICD-10-CM

## 2020-07-02 DIAGNOSIS — I48.91 ATRIAL FIBRILLATION, UNSPECIFIED TYPE (H): ICD-10-CM

## 2020-07-02 DIAGNOSIS — R73.03 PREDIABETES: ICD-10-CM

## 2020-07-02 PROCEDURE — 99207 ZZC CDG-CODE CATEGORY CHANGED: CPT | Performed by: NURSE PRACTITIONER

## 2020-07-02 PROCEDURE — 99358 PROLONG SERVICE W/O CONTACT: CPT | Performed by: NURSE PRACTITIONER

## 2020-07-02 RX ORDER — MULTIVIT-MIN/FA/LYCOPEN/LUTEIN .4-300-25
1 TABLET ORAL
COMMUNITY
Start: 2020-07-02

## 2020-07-02 RX ORDER — NYSTATIN 100000 U/G
CREAM TOPICAL 2 TIMES DAILY
COMMUNITY
Start: 2020-07-02

## 2020-07-02 RX ORDER — LANOLIN ALCOHOL/MO/W.PET/CERES
3 CREAM (GRAM) TOPICAL AT BEDTIME
COMMUNITY
Start: 2020-07-02

## 2020-07-02 RX ORDER — ACETAMINOPHEN 325 MG/1
650 TABLET ORAL EVERY 4 HOURS PRN
COMMUNITY
Start: 2020-07-02

## 2020-07-02 RX ORDER — POLYETHYLENE GLYCOL 3350 17 G/17G
1 POWDER, FOR SOLUTION ORAL DAILY PRN
COMMUNITY
Start: 2020-07-02

## 2020-07-02 RX ORDER — ATORVASTATIN CALCIUM 20 MG/1
20 TABLET, FILM COATED ORAL DAILY
COMMUNITY
Start: 2020-07-02

## 2020-07-02 RX ORDER — SENNOSIDES 8.6 MG
2 TABLET ORAL 2 TIMES DAILY PRN
COMMUNITY
Start: 2020-07-02

## 2020-07-03 NOTE — PROGRESS NOTES
[place] [facility]  NON-FACE TO FACE PROLONGED SERVICE    Rafael Josue 1950    Date of Related Face to Face Service: 7/6/20    INTENT OF SERVICE  This is an extended evaluation of patient's specific problem.  The service relates to a recent or future E/M visit.  Documentation supports medical necessity, relates to ongoing patient management and documents start times and stop times.    Regarding the following diagnoses:     Acute respiratory distress syndrome (ARDS) due to COVID-19 virus (H)  Chronic respiratory failure with hypoxia (H)  Chronic obstructive pulmonary disease, unspecified COPD type (H)  Atrial fibrillation, unspecified type (H)  Coronary artery disease involving native coronary artery of native heart without angina pectoris  Prediabetes  Critical illness myopathy,     * I reviewed records for patient's complicated medical history, including recent prolonged hospitalization at [facility] and inpatient rehab stay at [facility]; also reviewed current treatment plan, medication reconciliation, and clarification of TCU admission orders from nursing staff.  (Start time 1956, Stop time 2035; 39 minutes)    PMH:  hyperlipidemia, A-fib with RVR, COPD, hx former smoker, calcification of coronary artery, prediabetes, and carpal tunnel syndrome     Recent hospitalization at [facility] 5/19-6/15 due to respiratory distress w/known COVID19+ test on 5/15. Arrived to ED on 15L O2. CXR + bilateral upper lobe pneumonia, ARDS. ID consulted, was started on remdesivir, COVID19 convalescent plasma, and tocilizumab, methylprednisone. Required intubation on 5/20, vasopressor support with dopamine, and feeding tube and insulin gtt. Patient was extubated on 6/3. Encephalopathy gradually improved, was given short course of ritalin. Noted to have critical illness myopathy, encephalopathy, and cardiopulmonary debility associated with COVID19 and discharged to [facility] on 6/15 on 5L O2.     Patient was at [facility] 6/15-7/2 for inpatient rehab. Patient able to ambulate 100ft w/walker, requires supervision with stairs, ADLs. Diet advanced from DD4 with nectar thick liquids to regular thin liquid diet. Due to desaturation, was started on course of prednisone 6/18-6/23. Venous US BLE negative for DVT but +superficial thrombus noted. Currently taking lovenox every day. Noted to have A Fib during hospital stay, now NSR. Per hospital notes, \"no need for anticoagulation for prevention of arterial thromboembolism unless recurrent a fib and no need for rate control.\" Continues to require 3-5L O2. Repeat COVID19 test negative on 6/29.     Patient discharged to [facility] on 7/2.           ASSESSMENT/PLAN       Acute respiratory distress syndrome (ARDS) due to COVID-19 virus (H)  Chronic respiratory failure with hypoxia (H)  Chronic obstructive pulmonary disease, unspecified COPD type (H)  Atrial fibrillation, unspecified type (H)  Coronary artery disease involving native coronary artery of native heart without angina pectoris  Prediabetes  Critical illness myopathy    - Change acetaminophen to 650mg q4hrs prn   - Check CBC & BMP 7/6 dx ARDS  - Change O2 parameters to 3-5L O2   - Monitor O2 qshift and with therapy, wean O2 as tolerated. Keep O2 > 90%.   - ST to eval/treat cognition  - Check A1C 7/6 dx prediabetes          TIME  Total time spent with Non-Face to Face prolonged service 39 minutes.     Electronically signed by:  JACKELIN Oden CNP            "

## 2020-07-06 ENCOUNTER — NURSING HOME VISIT (OUTPATIENT)
Dept: GERIATRICS | Facility: CLINIC | Age: 70
End: 2020-07-06
Payer: COMMERCIAL

## 2020-07-06 ENCOUNTER — HOSPITAL LABORATORY (OUTPATIENT)
Dept: OTHER | Facility: CLINIC | Age: 70
End: 2020-07-06

## 2020-07-06 VITALS
SYSTOLIC BLOOD PRESSURE: 107 MMHG | HEART RATE: 64 BPM | WEIGHT: 141.4 LBS | RESPIRATION RATE: 18 BRPM | TEMPERATURE: 97.3 F | BODY MASS INDEX: 20.88 KG/M2 | DIASTOLIC BLOOD PRESSURE: 54 MMHG | OXYGEN SATURATION: 95 %

## 2020-07-06 DIAGNOSIS — I48.91 ATRIAL FIBRILLATION, UNSPECIFIED TYPE (H): ICD-10-CM

## 2020-07-06 DIAGNOSIS — J80 ACUTE RESPIRATORY DISTRESS SYNDROME (ARDS) DUE TO COVID-19 VIRUS (H): Primary | ICD-10-CM

## 2020-07-06 DIAGNOSIS — R73.03 PREDIABETES: ICD-10-CM

## 2020-07-06 DIAGNOSIS — U07.1 ACUTE RESPIRATORY DISTRESS SYNDROME (ARDS) DUE TO COVID-19 VIRUS (H): Primary | ICD-10-CM

## 2020-07-06 DIAGNOSIS — G72.81 CRITICAL ILLNESS MYOPATHY: ICD-10-CM

## 2020-07-06 DIAGNOSIS — J44.9 CHRONIC OBSTRUCTIVE PULMONARY DISEASE, UNSPECIFIED COPD TYPE (H): ICD-10-CM

## 2020-07-06 DIAGNOSIS — I25.10 CORONARY ARTERY DISEASE INVOLVING NATIVE CORONARY ARTERY OF NATIVE HEART WITHOUT ANGINA PECTORIS: ICD-10-CM

## 2020-07-06 DIAGNOSIS — J96.11 CHRONIC RESPIRATORY FAILURE WITH HYPOXIA (H): ICD-10-CM

## 2020-07-06 LAB
ANION GAP SERPL CALCULATED.3IONS-SCNC: 6 MMOL/L (ref 3–14)
BUN SERPL-MCNC: 9 MG/DL (ref 7–30)
CALCIUM SERPL-MCNC: 9.1 MG/DL (ref 8.5–10.1)
CHLORIDE SERPL-SCNC: 106 MMOL/L (ref 94–109)
CO2 SERPL-SCNC: 27 MMOL/L (ref 20–32)
CREAT SERPL-MCNC: 0.56 MG/DL (ref 0.66–1.25)
ERYTHROCYTE [DISTWIDTH] IN BLOOD BY AUTOMATED COUNT: 15.7 % (ref 10–15)
GFR SERPL CREATININE-BSD FRML MDRD: >90 ML/MIN/{1.73_M2}
GLUCOSE SERPL-MCNC: 109 MG/DL (ref 70–99)
HBA1C MFR BLD: 5.8 % (ref 0–5.6)
HCT VFR BLD AUTO: 38.4 % (ref 40–53)
HGB BLD-MCNC: 12.2 G/DL (ref 13.3–17.7)
MCH RBC QN AUTO: 32 PG (ref 26.5–33)
MCHC RBC AUTO-ENTMCNC: 31.8 G/DL (ref 31.5–36.5)
MCV RBC AUTO: 101 FL (ref 78–100)
PLATELET # BLD AUTO: 293 10E9/L (ref 150–450)
POTASSIUM SERPL-SCNC: 3.8 MMOL/L (ref 3.4–5.3)
RBC # BLD AUTO: 3.81 10E12/L (ref 4.4–5.9)
SODIUM SERPL-SCNC: 139 MMOL/L (ref 133–144)
WBC # BLD AUTO: 6.7 10E9/L (ref 4–11)

## 2020-07-06 PROCEDURE — 99305 1ST NF CARE MODERATE MDM 35: CPT | Performed by: NURSE PRACTITIONER

## 2020-07-06 RX ORDER — ALBUTEROL SULFATE 90 UG/1
2 AEROSOL, METERED RESPIRATORY (INHALATION) EVERY 4 HOURS PRN
COMMUNITY

## 2020-07-06 NOTE — PROGRESS NOTES
[place] [facility]    PRIMARY CARE PROVIDER AND CLINIC:  [facility], [address] / [place] [address]  Chief Complaint   Patient presents with     Hospital F/U     [facility] Medical Record Number:  [id number]  Place of Service where encounter took place:  [facility] [[id number]]    Rafael Josue  is a 69 year old  (1950), admitted to the above facility from  [facility]. Hospital stay 6/15 through 7/2/20..  Admitted to this facility for  rehab, medical management and nursing care.    HPI:    HPI information obtained from: facility chart records, facility staff, patient report, [facility] chart review and Care Everywhere [facility] chart review.     Brief Summary of Hospital Course:     PMH:  hyperlipidemia, A-fib with RVR, COPD, hx former smoker, calcification of coronary artery, prediabetes, and carpal tunnel syndrome      Recent hospitalization at [facility] 5/19-6/15 due to respiratory distress w/known COVID19+ test on 5/15. Arrived to ED on 15L O2. CXR + bilateral upper lobe pneumonia, ARDS. ID consulted, was started on remdesivir, COVID19 convalescent plasma, and tocilizumab, methylprednisone. Required intubation on 5/20, vasopressor support with dopamine, and feeding tube and insulin gtt. Patient was extubated on 6/3. Encephalopathy gradually improved, was given short course of ritalin. Noted to have critical illness myopathy, encephalopathy, and cardiopulmonary debility associated with COVID19 and discharged to [facility] on 6/15 on 5L O2.      Patient was at [facility] 6/15-7/2 for inpatient rehab. Patient able to ambulate 100ft w/walker, requires supervision with stairs, ADLs. Diet advanced from DD4 with nectar thick liquids to regular thin liquid diet. Due to desaturation, was started on course of prednisone 6/18-6/23. Venous US BLE negative for DVT but +superficial thrombus noted. Currently taking lovenox every  "day. Noted to have A Fib during hospital stay, now NSR. Per hospital notes, \"no need for anticoagulation for prevention of arterial thromboembolism unless recurrent a fib and no need for rate control.\" Continues to require 3-5L O2. Repeat COVID19 test negative on 6/29.      Patient discharged to [facility] on 7/2.         Updates on Status Since Skilled nursing Admission:     During exam, patient seen sitting in wheelchair. Reports progressing in therapy, feels stronger each week. Does endorse ongoing use of continuous O2, states this is new since COVID+. Reports intermittent dry cough, otherwise, denies SOB, productive cough or fevers. States appetite has been improving. Denies issues with bowel or bladder. Denies chest pain, syncope, headache. PTA lives w/spouse, states goal is to discharge home.           CODE STATUS/ADVANCE DIRECTIVES DISCUSSION:   CPR/Full code   Patient's living condition: lives with spouse  ALLERGIES: Patient has no known allergies.  PAST MEDICAL HISTORY:  has no past medical history on file.  PAST SURGICAL HISTORY:   has no past surgical history on file.  FAMILY HISTORY: family history is not on file.  SOCIAL HISTORY:   reports that he has quit smoking. He quit smokeless tobacco use about 2 years ago. He reports that he does not drink alcohol or use drugs.    Post Discharge Medication Reconciliation Status: discharge medications reconciled and changed, per note/orders (see AVS)    Current Outpatient Medications   Medication Sig Dispense Refill     acetaminophen (TYLENOL) 325 MG tablet Take 2 tablets (650 mg) by mouth every 4 hours as needed for mild pain       albuterol (PROAIR HFA/PROVENTIL HFA/VENTOLIN HFA) 108 (90 Base) MCG/ACT inhaler Inhale 2 puffs into the lungs every 4 hours as needed for shortness of breath / dyspnea or wheezing       atorvastatin (LIPITOR) 20 MG tablet Take 1 tablet (20 mg) by mouth daily       enoxaparin ANTICOAGULANT (LOVENOX) 40 MG/0.4ML syringe Inject 0.4 mLs (40 " mg) Subcutaneous daily       melatonin 3 MG tablet Take 1 tablet (3 mg) by mouth At Bedtime       Multiple Vitamins-Minerals (CEROVITE SENIOR) TABS Take 1 tablet by mouth       nystatin (MYCOSTATIN) 921291 UNIT/GM external cream Apply topically 2 times daily       polyethylene glycol (MIRALAX) 17 g packet Take 17 g by mouth daily as needed for constipation       sennosides (SENOKOT) 8.6 MG tablet Take 2 tablets by mouth 2 times daily as needed for constipation       umeclidinium-vilanterol (ANORO ELLIPTA) 62.5-25 MCG/INH oral inhaler Inhale 1 puff into the lungs daily           ROS:  10 point ROS of systems including Constitutional, Eyes, Respiratory, Cardiovascular, Gastroenterology, Genitourinary, Integumentary, Musculoskeletal, Psychiatric were all negative except for pertinent positives noted in my HPI.      Vitals:  /54   Pulse 64   Temp 97.3  F (36.3  C)   Resp 18   Wt 64.1 kg (141 lb 6.4 oz)   SpO2 95%   BMI 20.88 kg/m    Exam:  Limited observational exam due to COVID19 precautions  GENERAL APPEARANCE:  Alert, in no distress, appears healthy, oriented, cooperative  ENT:  Mouth and posterior oropharynx normal, moist mucous membranes, normal hearing acuity  EYES:  EOM, conjunctivae, lids, pupils and irises normal, EOM normal  NECK:  No adenopathy,masses or thyromegaly  RESP:  no respiratory distress, +dry cough  CV:  no edema  M/S:   Gait and station abnormal, in wheelchair; ambulates w/walker. Digits and nails normal  SKIN:  Inspection of skin and subcutaneous tissue baseline  NEURO:   Cranial nerves 2-12 are normal tested and grossly at patient's baseline  PSYCH:  oriented X 3, affect and mood normal      Lab/Diagnostic data:  Recent labs in [facility] reviewed by me today.       ASSESSMENT/PLAN:    (U07.1,  J80) Acute respiratory distress syndrome (ARDS) due to COVID-19 virus (H)  (primary encounter diagnosis)  (J96.11) Chronic respiratory failure with hypoxia (H)  (J44.9) Chronic obstructive pulmonary  "disease, unspecified COPD type (H)  Comment: ARDS d/t COVID19 with subsequent chronic respiratory failure w/hypoxia (+ COVID test 5/15; tested negative on 6/29). Intubated 5/20, extubated 6/2. Chronic COPD.    Plan:   - Add albuterol inhaler 108mcg/act, take 2 puffs q4hrs prn for SOB, wheezing  - Continue anoro inhaler every day   - Add incentive spirometer, 10 breaths q2hrs while awake.   - Monitor O2 qshift and with therapy, keep O2 > 90% and wean as able. Dx ARDS due to COVID19  - Referral to outpatient Pulmonologist d/t ARDS  - Patient verbalizes understanding and agrees with treatment plan.     (I48.91) Atrial fibrillation, unspecified type (H)  Comment: Paroxysmal atrial fibrillation, last EKG noted NSR.   Plan:   - Per hospital notes, \"no need for anticoagulation prevention of arterial thromboembolism unless recurrent A fib.\"  - Venous US negative for DVT, but +thrombosed superficial varicose vein on 6/24; continued on lovenox d/t decreased mobility. Plan to continue lovenox until increased activity w/therapy.    (I25.10) Coronary artery disease involving native coronary artery of native heart without angina pectoris  Comment: Chronic CAD w/hyperlipidemia   Plan:   - Continue lipitor    (R73.03) Prediabetes  Comment: Controlled. Noted to have stress and steroid induced hyperglycemia, requires sliding scale insulin during hospital stay. Last A1C 6.1% on 1/9/20; A1C rechecked 5.8% on 7/6.   Plan:   - Recheck A1C in 6 months w/PCP    (G72.81) Critical illness myopathy  Comment: Ongoing critical illness myopathy r/t prolonged hospital stay  Plan:   - Encourage active participation in therapy session to increase strength and promote independence in activities and ADLs.   - Cognitive testing to be done in therapy.   - SW following for discharge planning. States goal is to discharge home.   - Patient confirms code status as full code  - Patient verbalizes understanding and agrees with treatment plan.      "                 Total time spent with patient visit at the skilled nursing facility was 44 minutes including patient visit and review of past records. Greater than 50% of total time (23 minutes) spent with counseling patient regarding treatment plan, medication management, follow-up labs. Coordinating care with SW regarding discharge planning.     Electronically signed by:  JACKELIN Oden CNP

## 2020-07-15 ENCOUNTER — NURSING HOME VISIT (OUTPATIENT)
Dept: GERIATRICS | Facility: CLINIC | Age: 70
End: 2020-07-15
Payer: COMMERCIAL

## 2020-07-15 VITALS
RESPIRATION RATE: 18 BRPM | WEIGHT: 180.5 LBS | HEART RATE: 79 BPM | TEMPERATURE: 98.3 F | DIASTOLIC BLOOD PRESSURE: 80 MMHG | SYSTOLIC BLOOD PRESSURE: 130 MMHG | OXYGEN SATURATION: 94 % | BODY MASS INDEX: 26.66 KG/M2

## 2020-07-15 DIAGNOSIS — U07.1 ACUTE RESPIRATORY DISTRESS SYNDROME (ARDS) DUE TO COVID-19 VIRUS (H): Primary | ICD-10-CM

## 2020-07-15 DIAGNOSIS — J44.9 CHRONIC OBSTRUCTIVE PULMONARY DISEASE, UNSPECIFIED COPD TYPE (H): ICD-10-CM

## 2020-07-15 DIAGNOSIS — J80 ACUTE RESPIRATORY DISTRESS SYNDROME (ARDS) DUE TO COVID-19 VIRUS (H): Primary | ICD-10-CM

## 2020-07-15 DIAGNOSIS — G72.81 CRITICAL ILLNESS MYOPATHY: ICD-10-CM

## 2020-07-15 DIAGNOSIS — J96.11 CHRONIC RESPIRATORY FAILURE WITH HYPOXIA (H): ICD-10-CM

## 2020-07-15 PROBLEM — R13.10 DYSPHAGIA: Status: ACTIVE | Noted: 2020-06-15

## 2020-07-15 PROBLEM — Z91.89 AT RISK FOR HEALTHCARE ASSOCIATED INFECTION: Status: ACTIVE | Noted: 2020-05-24

## 2020-07-15 PROBLEM — R73.03 PRE-DIABETES: Status: ACTIVE | Noted: 2018-02-24

## 2020-07-15 PROBLEM — R91.1 LUNG NODULE SEEN ON IMAGING STUDY: Status: ACTIVE | Noted: 2018-02-24

## 2020-07-15 PROBLEM — J15.20 STAPHYLOCOCCAL PNEUMONIA (H): Status: ACTIVE | Noted: 2020-06-01

## 2020-07-15 PROBLEM — Z79.01 ANTICOAGULATION MONITORING, INR RANGE 2-3: Status: ACTIVE | Noted: 2018-02-26

## 2020-07-15 PROBLEM — E87.8 HYPERCHLOREMIA: Status: ACTIVE | Noted: 2020-06-02

## 2020-07-15 PROBLEM — G93.41 METABOLIC ENCEPHALOPATHY: Status: ACTIVE | Noted: 2020-06-01

## 2020-07-15 PROBLEM — E87.0 HYPERNATREMIA: Status: ACTIVE | Noted: 2020-06-01

## 2020-07-15 PROBLEM — I48.91 NEW ONSET ATRIAL FIBRILLATION (H): Status: ACTIVE | Noted: 2018-02-23

## 2020-07-15 PROBLEM — D12.4 ADENOMATOUS POLYP OF DESCENDING COLON: Status: ACTIVE | Noted: 2017-09-05

## 2020-07-15 PROBLEM — J96.01 ACUTE RESPIRATORY FAILURE WITH HYPOXIA (H): Status: ACTIVE | Noted: 2020-05-19

## 2020-07-15 PROBLEM — Z11.59 ENCOUNTER FOR HCV SCREENING TEST FOR LOW RISK PATIENT: Status: ACTIVE | Noted: 2019-07-31

## 2020-07-15 PROBLEM — I25.10 CALCIFICATION OF CORONARY ARTERY: Status: ACTIVE | Noted: 2018-06-21

## 2020-07-15 PROBLEM — R69 LIFE THREATENING MEDICAL ILLNESS: Status: ACTIVE | Noted: 2020-05-24

## 2020-07-15 PROCEDURE — 99309 SBSQ NF CARE MODERATE MDM 30: CPT | Performed by: NURSE PRACTITIONER

## 2020-07-15 NOTE — PROGRESS NOTES
"[facility]    [facility] Record Number:  [id number]  Place of Service where encounter took place:  [facility] [place] ([facility]) [[id number]]  Chief Complaint   Patient presents with     Nursing Home Acute       HPI:    Rafael Josue  is a 69 year old (1950), who is being seen today for an episodic care visit.  HPI information obtained from: facility chart records, facility staff, patient report, [facility] chart review and Care Everywhere [facility] chart review.     PMH:  hyperlipidemia, A-fib with RVR, COPD, hx former smoker, calcification of coronary artery, prediabetes, and carpal tunnel syndrome      Recent hospitalization at [facility] 5/19-6/15 due to respiratory distress w/known COVID19+ test on 5/15. Arrived to ED on 15L O2. CXR + bilateral upper lobe pneumonia, ARDS. ID consulted, was started on remdesivir, COVID19 convalescent plasma, and tocilizumab, methylprednisone. Required intubation on 5/20, vasopressor support with dopamine, and feeding tube and insulin gtt. Patient was extubated on 6/3. Encephalopathy gradually improved, was given short course of ritalin. Noted to have critical illness myopathy, encephalopathy, and cardiopulmonary debility associated with COVID19 and discharged to [facility] on 6/15 on 5L O2.      Patient was at [facility] 6/15-7/2 for inpatient rehab. Patient able to ambulate 100ft w/walker, requires supervision with stairs, ADLs. Diet advanced from DD4 with nectar thick liquids to regular thin liquid diet. Due to desaturation, was started on course of prednisone 6/18-6/23. Venous US BLE negative for DVT but +superficial thrombus noted. Currently taking lovenox every day. Noted to have A Fib during hospital stay, now NSR. Per hospital notes, \"no need for anticoagulation for prevention of arterial thromboembolism unless recurrent a fib and no need for rate control.\" Continues to require 3-5L O2. Repeat COVID19 test negative on 6/29. "      Patient discharged [facility] on 7/2.       Today's concern is:    During exam, patient seen sitting in wheelchair. Reports progressing in therapy, feels stronger each week. States staff have been attempting to wean O2, primarily states this occurs during therapy. Requires 2-3L at rest and 5L w/activity. Patient does endorse chest tightness w/activity, has not used albuterol inhaler prn. Questions weather or not he can return to work as a  if using O2. Endorses good appetite. Sleeping well at night. Denies issues with bowel or bladder. Denies chest pain, syncope, headache. PTA lives w/spouse, states goal is to discharge home.         Past Medical and Surgical History reviewed in Epic today.    MEDICATIONS:    Current Outpatient Medications   Medication Sig Dispense Refill     acetaminophen (TYLENOL) 325 MG tablet Take 2 tablets (650 mg) by mouth every 4 hours as needed for mild pain       albuterol (PROAIR HFA/PROVENTIL HFA/VENTOLIN HFA) 108 (90 Base) MCG/ACT inhaler Inhale 2 puffs into the lungs every 4 hours as needed for shortness of breath / dyspnea or wheezing       atorvastatin (LIPITOR) 20 MG tablet Take 1 tablet (20 mg) by mouth daily       enoxaparin ANTICOAGULANT (LOVENOX) 40 MG/0.4ML syringe Inject 0.4 mLs (40 mg) Subcutaneous daily       melatonin 3 MG tablet Take 1 tablet (3 mg) by mouth At Bedtime       Multiple Vitamins-Minerals (CEROVITE SENIOR) TABS Take 1 tablet by mouth       nystatin (MYCOSTATIN) 887490 UNIT/GM external cream Apply topically 2 times daily       polyethylene glycol (MIRALAX) 17 g packet Take 17 g by mouth daily as needed for constipation       sennosides (SENOKOT) 8.6 MG tablet Take 2 tablets by mouth 2 times daily as needed for constipation       umeclidinium-vilanterol (ANORO ELLIPTA) 62.5-25 MCG/INH oral inhaler Inhale 1 puff into the lungs daily             REVIEW OF SYSTEMS:  4 point ROS including Respiratory, CV, GI and , other than that noted in the  HPI,  is negative      Objective:  /80   Pulse 79   Temp 98.3  F (36.8  C)   Resp 18   Wt 81.9 kg (180 lb 8 oz)   SpO2 94%   BMI 26.66 kg/m    Exam:  Limited observational exam due to COVID19 precautions  GENERAL APPEARANCE:  Alert, in no distress, appears healthy, oriented, cooperative  ENT:  Mouth and posterior oropharynx normal, moist mucous membranes, normal hearing acuity  EYES:  EOM, conjunctivae, lids, pupils and irises normal, EOM normal  NECK:  No adenopathy,masses or thyromegaly  RESP:  no respiratory distress, +dry cough  CV:  no edema  M/S:   Gait and station abnormal, in wheelchair; ambulates w/walker. Digits and nails normal  SKIN:  Inspection of skin and subcutaneous tissue baseline  NEURO:   Cranial nerves 2-12 are normal tested and grossly at patient's baseline  PSYCH:  oriented X 3, affect and mood normal      Labs:   Labs done in SNF are in [facility]. Please refer to them using [employer]/Care Everywhere., Recent labs in EPIC reviewed by me today.  and   Most Recent 3 CBC's:  Recent Labs   Lab Test 07/06/20  0655 02/21/18  0520 02/20/18  0525 02/19/18  0500 02/16/18  1605   WBC 6.7 15.3* 20.5* 17.7* 7.3   HGB 12.2*  --   --  11.3* 12.6*   *  --   --  97 94     --   --  346  Canceled, Test credited 196     Most Recent 3 BMP's:  Recent Labs   Lab Test 07/06/20  0655 02/21/18  0520 02/20/18  0525    137 135   POTASSIUM 3.8 4.0 4.3   CHLORIDE 106 103 101   CO2 27 27 29   BUN 9 14 12   CR 0.56* 0.75 0.74   ANIONGAP 6 7 5   LISETH 9.1 8.0* 8.1*   * 132* 125*           ASSESSMENT/PLAN:     (U07.1,  J80) Acute respiratory distress syndrome (ARDS) due to COVID-19 virus (H)  (primary encounter diagnosis)  (J96.11) Chronic respiratory failure with hypoxia (H)  (J44.9) Chronic obstructive pulmonary disease, unspecified COPD type (H)  Comment: ARDS d/t COVID19 with subsequent chronic respiratory failure w/hypoxia (+ COVID test 5/15; tested negative on 6/29). Intubated 5/20,  extubated 6/2. Chronic COPD. Currently requires 2-5L continuous O2 (2-3L at rest, 5L w/activity).   Plan:   - Continue albuterol inhaler prn for SOB, wheezing  - Continue anoro inhaler every day   - Continue incentive spirometer, 10 breaths q2hrs while awake.   - Monitor O2 qshift and with therapy, keep O2 > 90% and wean as able. Dx ARDS due to COVID19  - Referral to outpatient Pulmonologist d/t ARDS, [facility] assisting with coordinating appt details     (G72.81) Critical illness myopathy  Comment: Ongoing critical illness myopathy r/t prolonged hospital stay  Plan:   - Encourage active participation in therapy session to increase strength and promote independence in activities and ADLs.   - Cognitive testing to be done in therapy.   - Continue lovenox due to decreased mobility and hx paroxysmal atrial fibrillation   -  following for discharge planning. States goal is to discharge home.           Electronically signed by:  JACKELIN Oden CNP

## 2020-07-21 ENCOUNTER — DISCHARGE SUMMARY NURSING HOME (OUTPATIENT)
Dept: GERIATRICS | Facility: CLINIC | Age: 70
End: 2020-07-21
Payer: COMMERCIAL

## 2020-07-21 VITALS
BODY MASS INDEX: 26.88 KG/M2 | SYSTOLIC BLOOD PRESSURE: 160 MMHG | RESPIRATION RATE: 18 BRPM | DIASTOLIC BLOOD PRESSURE: 87 MMHG | WEIGHT: 182 LBS | TEMPERATURE: 97.5 F | HEART RATE: 73 BPM | OXYGEN SATURATION: 92 %

## 2020-07-21 DIAGNOSIS — G72.81 CRITICAL ILLNESS MYOPATHY: ICD-10-CM

## 2020-07-21 DIAGNOSIS — J80 ACUTE RESPIRATORY DISTRESS SYNDROME (ARDS) DUE TO COVID-19 VIRUS (H): Primary | ICD-10-CM

## 2020-07-21 DIAGNOSIS — I25.10 CORONARY ARTERY DISEASE INVOLVING NATIVE CORONARY ARTERY OF NATIVE HEART WITHOUT ANGINA PECTORIS: ICD-10-CM

## 2020-07-21 DIAGNOSIS — R73.03 PREDIABETES: ICD-10-CM

## 2020-07-21 DIAGNOSIS — I48.91 ATRIAL FIBRILLATION, UNSPECIFIED TYPE (H): ICD-10-CM

## 2020-07-21 DIAGNOSIS — J44.9 CHRONIC OBSTRUCTIVE PULMONARY DISEASE, UNSPECIFIED COPD TYPE (H): ICD-10-CM

## 2020-07-21 DIAGNOSIS — U07.1 ACUTE RESPIRATORY DISTRESS SYNDROME (ARDS) DUE TO COVID-19 VIRUS (H): Primary | ICD-10-CM

## 2020-07-21 DIAGNOSIS — J96.11 CHRONIC RESPIRATORY FAILURE WITH HYPOXIA (H): ICD-10-CM

## 2020-07-21 PROCEDURE — 99316 NF DSCHRG MGMT 30 MIN+: CPT | Performed by: NURSE PRACTITIONER

## 2020-07-21 NOTE — PROGRESS NOTES
[facility] DISCHARGE SUMMARY    PATIENT'S NAME: Rafael Josue  YOB: 1950  MEDICAL RECORD NUMBER:  [id number]  Place of Service where encounter took place:  [facility] [place] ([facility]) [[id number]]    PRIMARY CARE PROVIDER AND CLINIC RESPONSIBLE AFTER TRANSFER:   [facility], [address] / [facility] [address]    Non-[facility] Provider     Transferring providers: JACKELIN Oden CNP, Keegan Johnson MD  Recent Hospitalization/ED:  Hospital  [facility] 5/19-6/15 then [facility] 6/15-7/2.  Date of SNF Admission: July / 02 / 2020  Date of SNF (anticipated) Discharge: July / 25 / 2020  Discharged to: previous independent home w/spouse  Cognitive Scores: Safety Questionnaire: 20/22  Physical Function: Ambulating 200-300 ft with walker  DME: Walker and Home Oxygen    CODE STATUS/ADVANCE DIRECTIVES DISCUSSION:  Full Code   ALLERGIES: Patient has no known allergies.      DISCHARGE DIAGNOSIS/NURSING FACILITY COURSE:     PMH:  hyperlipidemia, A-fib with RVR, COPD, hx former smoker, calcification of coronary artery, prediabetes, and carpal tunnel syndrome      Recent hospitalization at [facility] 5/19-6/15 due to respiratory distress w/known COVID19+ test on 5/15. Arrived to ED on 15L O2. CXR + bilateral upper lobe pneumonia, ARDS. ID consulted, was started on remdesivir, COVID19 convalescent plasma, and tocilizumab, methylprednisone. Required intubation on 5/20, vasopressor support with dopamine, and feeding tube and insulin gtt. Patient was extubated on 6/3. Encephalopathy gradually improved, was given short course of ritalin. Noted to have critical illness myopathy, encephalopathy, and cardiopulmonary debility associated with COVID19 and discharged to [facility] on 6/15 on 5L O2.      Patient was at [facility] 6/15-7/2 for inpatient rehab. Patient able to ambulate 100ft w/walker, requires supervision with stairs, ADLs.  "Diet advanced from DD4 with nectar thick liquids to regular thin liquid diet. Due to desaturation, was started on course of prednisone 6/18-6/23. Venous US BLE negative for DVT but +superficial thrombus noted. Currently taking lovenox every day. Noted to have A Fib during hospital stay, now NSR. Per hospital notes, \"no need for anticoagulation for prevention of arterial thromboembolism unless recurrent a fib and no need for rate control.\" Continues to require 3-5L O2. Repeat COVID19 test negative on 6/29.      Patient discharged to [facility] on 7/2.       Acute respiratory distress syndrome (ARDS) due to COVID-19 virus (H)  Chronic respiratory failure with hypoxia (H)  Chronic obstructive pulmonary disease, unspecified COPD type (H)  ARDS d/t COVID19 with subsequent chronic respiratory failure w/hypoxia (+ COVID test 5/15; tested negative on 6/29). Intubated 5/20, extubated 6/2. Chronic COPD. Currently taking anoro every day and albuterol prn. Also using incentive spirometer throughout the day. Denies SOB or coughing, does endorse chest tightness w/activity. Referral placed for Pulmonologist.     Critical illness myopathy  PTA lives w/spouse. Participating in therapy, states each week getting stronger. Patient discharging home w/spouse. Patient has plans to participate in outpatient PT, OT at [facility] and outpatient Pulmonary rehab program.     Atrial fibrillation, unspecified type (H)  Paroxysmal atrial fibrillation, last EKG noted NSR. Venous US negative for DVT, but +thrombosed superficial varicose vein on 6/24; continued on lovenox d/t decreased mobility. Per hospital notes, \"no need for anticoagulation prevention of arterial thromboembolism unless recurrent A fib.\" Plan to continue lovenox until increased activity w/therapy, reviewed w/patient who verbalizes understanding. RN working w/patient on lovenox teaching. HR range 60-80s and -160s.     Coronary artery disease involving native coronary artery " of native heart without angina pectoris  Chronic CAD w/hyperlipidemia. Currently taking lipitor.     Prediabetes  Controlled w/diet. Hyperglycemia resolved w/completion of steroid therapy.    Lab Results   Component Value Date    A1C 5.8 07/06/2020    A1C 6.3 02/17/2018               Past Medical History:  has no past medical history on file.    Discharge Medications:    Current Outpatient Medications   Medication Sig Dispense Refill     acetaminophen (TYLENOL) 325 MG tablet Take 2 tablets (650 mg) by mouth every 4 hours as needed for mild pain       albuterol (PROAIR HFA/PROVENTIL HFA/VENTOLIN HFA) 108 (90 Base) MCG/ACT inhaler Inhale 2 puffs into the lungs every 4 hours as needed for shortness of breath / dyspnea or wheezing       atorvastatin (LIPITOR) 20 MG tablet Take 1 tablet (20 mg) by mouth daily       enoxaparin ANTICOAGULANT (LOVENOX) 40 MG/0.4ML syringe Inject 0.4 mLs (40 mg) Subcutaneous daily       melatonin 3 MG tablet Take 1 tablet (3 mg) by mouth At Bedtime       Multiple Vitamins-Minerals (CEROVITE SENIOR) TABS Take 1 tablet by mouth       nystatin (MYCOSTATIN) 624013 UNIT/GM external cream Apply topically 2 times daily       polyethylene glycol (MIRALAX) 17 g packet Take 17 g by mouth daily as needed for constipation       sennosides (SENOKOT) 8.6 MG tablet Take 2 tablets by mouth 2 times daily as needed for constipation       umeclidinium-vilanterol (ANORO ELLIPTA) 62.5-25 MCG/INH oral inhaler Inhale 1 puff into the lungs daily         Medication Changes/Rationale:     Added albuterol prn for SOB, wheezing    Course of lovenox to be complete don 8/2, or if improved activity >300ft    Controlled medications sent with patient:   not applicable/none           ROS:   10 point ROS of systems including Constitutional, Eyes, Respiratory, Cardiovascular, Gastroenterology, Genitourinary, Integumentary, Musculoskeletal, Psychiatric were all negative except for pertinent positives noted in my  HPI.      Physical Exam:   Vitals: BP (!) 160/87   Pulse 73   Temp 97.5  F (36.4  C)   Resp 18   Wt 82.6 kg (182 lb)   SpO2 92%   BMI 26.88 kg/m    BMI= Body mass index is 26.88 kg/m .  Limited observational exam due to COVID19 precautions  GENERAL APPEARANCE:  Alert, in no distress, appears healthy, oriented, cooperative  ENT:  Mouth and posterior oropharynx normal, moist mucous membranes, normal hearing acuity  EYES:  EOM, conjunctivae, lids, pupils and irises normal, EOM normal  NECK:  No adenopathy,masses or thyromegaly  RESP:  no respiratory distress, no coughing  CV:  no edema  M/S:   Gait and station abnormal, in wheelchair; ambulates w/walker. Digits and nails normal  SKIN:  Inspection of skin and subcutaneous tissue baseline  NEURO:   Cranial nerves 2-12 are normal tested and grossly at patient's baseline  PSYCH:  oriented X 3, affect and mood normal      SNF labs: Labs done in SNF are in [facility]. Please refer to them using [employer]/Care Everywhere., Recent labs in EPIC reviewed by me today.  and   Most Recent 3 CBC's:  Recent Labs   Lab Test 07/06/20  0655 02/21/18  0520 02/20/18  0525 02/19/18  0500 02/16/18  1605   WBC 6.7 15.3* 20.5* 17.7* 7.3   HGB 12.2*  --   --  11.3* 12.6*   *  --   --  97 94     --   --  346  Canceled, Test credited 196     Most Recent 3 BMP's:  Recent Labs   Lab Test 07/06/20  0655 02/21/18  0520 02/20/18  0525    137 135   POTASSIUM 3.8 4.0 4.3   CHLORIDE 106 103 101   CO2 27 27 29   BUN 9 14 12   CR 0.56* 0.75 0.74   ANIONGAP 6 7 5   LISETH 9.1 8.0* 8.1*   * 132* 125*           DISCHARGE PLAN:    Follow up labs: No labs orders/due    Medical Follow Up:      Follow up with primary care provider in 1-2 weeks   Follow up with specialist: referred to Pulmonologist       Discharge Services: Out Patient:  physical therapy and occupational therapy      Discharge Instructions Verbalized to Patient at Discharge:     Notify PCP if you have a fever greater  than 100.5 degrees.     Oxygen at 1-5 liters/minute via nasal cannula.                 TOTAL DISCHARGE TIME:   Greater than 30 minutes    Electronically signed by:  JACKELIN Oden CNP

## 2020-07-22 NOTE — PROGRESS NOTES
Face to Face and Medical Necessity Statement for DME Provider visit    Demographic Information on Rafael Josue:  Gender: male  : 1950  [address]   [facility] [id number] [phone] (home)     Medical Record: [id number]  Social Security Number: [id number]  Primary Care Provider: [facility]  Insurance: Payor: BLUE PLUS / Plan: BLUE [place]US [place] [employer]VANTAGE / Product Type: HMO /     HPI:   Rafael Josue is a 69 year old  (1950), who is being seen today for a face to face provider visit at [facility] medical necessity statement for DME included. This patient requires the following:  DME Ordered and Medical Necessity Statement   Oxygen: 1-5 L/min via nasal canula neither continuous nor needs portable tank due to mobility in home environment ; Clinical Documentation: (Obtain documented RA sat with in 2 days of discharge in acute setting or within 30 days of provider visit):    Method 1: Room air at rest: Qualifing sat level is < 88% or below on room air at rest on 20 date         Pt needing above DME with expected length of need of 6 months due to medical necessity associated with following diagnosis:     Acute respiratory distress syndrome (ARDS) due to COVID-19 virus (H)  Chronic respiratory failure with hypoxia (H)  Chronic obstructive pulmonary disease, unspecified COPD type (H)  Critical illness myopathy  Atrial fibrillation, unspecified type (H)  Coronary artery disease involving native coronary artery of native heart without angina pectoris  Prediabetes        PMH   has no past medical history on file.      ROS:10 point ROS of systems including Constitutional, Eyes, Respiratory, Cardiovascular, Gastroenterology, Genitourinary, Integumentary, Musculoskeletal, Psychiatric were all negative except for pertinent positives noted in my HPI.      EXAM  Vitals: BP (!) 160/87   Pulse 73   Temp 97.5  F (36.4  C)   Resp 18   Wt 82.6 kg  (182 lb)   SpO2 92%   BMI 26.88 kg/m  ;BMI= Body mass index is 26.88 kg/m .  Limited observational exam due to COVID19 precautions  GENERAL APPEARANCE:  Alert, in no distress, appears healthy, oriented, cooperative  ENT:  Mouth and posterior oropharynx normal, moist mucous membranes, normal hearing acuity  EYES:  EOM, conjunctivae, lids, pupils and irises normal, EOM normal  NECK:  No adenopathy,masses or thyromegaly  RESP:  no respiratory distress, no coughing  CV:  no edema  M/S:   Gait and station abnormal, in wheelchair; ambulates w/walker. Digits and nails normal  SKIN:  Inspection of skin and subcutaneous tissue baseline  NEURO:   Cranial nerves 2-12 are normal tested and grossly at patient's baseline  PSYCH:  oriented X 3, affect and mood normal       ASSESSMENT/PLAN:  1. Acute respiratory distress syndrome (ARDS) due to COVID-19 virus (H)    2. Chronic respiratory failure with hypoxia (H)    3. Chronic obstructive pulmonary disease, unspecified COPD type (H)    4. Critical illness myopathy    5. Atrial fibrillation, unspecified type (H)    6. Coronary artery disease involving native coronary artery of native heart without angina pectoris    7. Prediabetes          Orders:  1. Facility staff/TC to contact DME company to get their order form for provider to fill out      ELECTRONICALLY SIGNED BY PREM CERTIFIED PROVIDER:  JACKELIN Oden CNP   NPI: [id number]  [facility]  [address] [place] [address], [address]  [place] [address]

## 2020-07-22 NOTE — PROGRESS NOTES
[facility] Discharge Orders    Name: Rafael Josue  : 1950  Planned Discharge Date: 20  Discharged to: previous independent home      MEDICAL FOLLOW UP              Follow up with primary care provider in 1-2 weeks              Follow up with specialist: referred to Pulmonologist       FUTURE LABS: No labs orders/due      ORDER CHANGES:    Added albuterol prn for SOB, wheezing    Course of lovenox to be complete don , or if improved activity >300ft    DISCHARGE MEDICATIONS:  The patient s pharmacy is authorized to dispense a 30-day supply of medications. Refill requests should be directed to the primary provider, [facility].   No narcotics are prescribed at time of discharge.     Current Outpatient Medications   Medication Sig Dispense Refill     acetaminophen (TYLENOL) 325 MG tablet Take 2 tablets (650 mg) by mouth every 4 hours as needed for mild pain       albuterol (PROAIR HFA/PROVENTIL HFA/VENTOLIN HFA) 108 (90 Base) MCG/ACT inhaler Inhale 2 puffs into the lungs every 4 hours as needed for shortness of breath / dyspnea or wheezing       atorvastatin (LIPITOR) 20 MG tablet Take 1 tablet (20 mg) by mouth daily       enoxaparin ANTICOAGULANT (LOVENOX) 40 MG/0.4ML syringe Inject 0.4 mLs (40 mg) Subcutaneous daily       melatonin 3 MG tablet Take 1 tablet (3 mg) by mouth At Bedtime       Multiple Vitamins-Minerals (CEROVITE SENIOR) TABS Take 1 tablet by mouth       nystatin (MYCOSTATIN) 138752 UNIT/GM external cream Apply topically 2 times daily       polyethylene glycol (MIRALAX) 17 g packet Take 17 g by mouth daily as needed for constipation       sennosides (SENOKOT) 8.6 MG tablet Take 2 tablets by mouth 2 times daily as needed for constipation       umeclidinium-vilanterol (ANORO ELLIPTA) 62.5-25 MCG/INH oral inhaler Inhale 1 puff into the lungs daily         SERVICES:  Out Patient:  physical therapy and occupational therapy    ADDITIONAL  INSTRUCTIONS:  ? Notify PCP if you have a fever greater than 100.5 degrees.   ? Oxygen at 1-5 liters/minute via nasal cannula.          JACKELIN Oden CNP  This document was electronically signed on July 21, 2020

## 2020-11-12 NOTE — PROGRESS NOTES
[facility]  [address]  [facility] [address]  Phone: [phone]  Fax: [phone]                                    Lashell Munguia   MRN: [id number]    Department: [facility]   Date of Visit: 11/12/2020           After Visit Summary Signature Page    I have received my discharge instructions, and my questions have been answered. I have discussed any challenges I see with this plan with the nurse or doctor.    ..........................................................................................................................................  Patient/Patient Representative Signature      ..........................................................................................................................................  Patient Representative Print Name and Relationship to Patient    ..................................................               ................................................  Date                                   Time    ..........................................................................................................................................  Reviewed by Signature/Title    ...................................................              ..............................................  Date                                               Time          22EPIC Rev 08/18        Nebs given as ordered. LS are diminished t/o and pt is on 2L. Will continue to follow.  Jose Cruz Santiago